# Patient Record
Sex: FEMALE | Race: WHITE | Employment: FULL TIME | URBAN - METROPOLITAN AREA
[De-identification: names, ages, dates, MRNs, and addresses within clinical notes are randomized per-mention and may not be internally consistent; named-entity substitution may affect disease eponyms.]

---

## 2018-12-10 DIAGNOSIS — E11.9 TYPE 2 DIABETES MELLITUS WITHOUT COMPLICATION, WITH LONG-TERM CURRENT USE OF INSULIN (HCC): ICD-10-CM

## 2018-12-10 DIAGNOSIS — I10 ESSENTIAL HYPERTENSION: ICD-10-CM

## 2018-12-10 DIAGNOSIS — Z79.4 TYPE 2 DIABETES MELLITUS WITHOUT COMPLICATION, WITH LONG-TERM CURRENT USE OF INSULIN (HCC): ICD-10-CM

## 2018-12-10 DIAGNOSIS — E11.8 TYPE 2 DIABETES MELLITUS WITH COMPLICATION, WITHOUT LONG-TERM CURRENT USE OF INSULIN (HCC): ICD-10-CM

## 2018-12-11 RX ORDER — LISINOPRIL AND HYDROCHLOROTHIAZIDE 12.5; 1 MG/1; MG/1
1 TABLET ORAL 2 TIMES DAILY
Qty: 180 TABLET | Refills: 0 | Status: SHIPPED
Start: 2018-12-11 | End: 2022-07-07 | Stop reason: DRUGHIGH

## 2020-12-11 ENCOUNTER — HOSPITAL ENCOUNTER (EMERGENCY)
Age: 55
Discharge: HOME | End: 2020-12-11

## 2020-12-11 VITALS
DIASTOLIC BLOOD PRESSURE: 93 MMHG | WEIGHT: 260 LBS | RESPIRATION RATE: 18 BRPM | HEIGHT: 62 IN | HEART RATE: 88 BPM | OXYGEN SATURATION: 97 % | TEMPERATURE: 98.5 F | BODY MASS INDEX: 47.84 KG/M2 | SYSTOLIC BLOOD PRESSURE: 141 MMHG

## 2020-12-11 PROCEDURE — 99283 EMERGENCY DEPT VISIT LOW MDM: CPT

## 2020-12-11 PROCEDURE — 6370000000 HC RX 637 (ALT 250 FOR IP)

## 2020-12-11 RX ADMIN — PHENYLEPHRINE HYDROCHLORIDE: 0.25 SPRAY NASAL at 19:11

## 2020-12-11 NOTE — LETTER
5 [facility] Emergency Department  [address] [place] [address]  Phone: [phone]               December 11, 2020    Patient: Erna Mclain   YOB: 1965   Date of Visit: 12/11/2020       To Whom It May Concern:    Erna Mclain was seen and treated in our emergency department on 12/11/2020. She may return to work on 12-13-20.       Sincerely,       LACHELLE Saldana CNP         Signature:__________________________________

## 2020-12-11 NOTE — LETTER
5 [facility] Emergency Department  [address] [place] [address]  Phone: [phone]               December 11, 2020    Patient: Jerad Fairchild   YOB: 1965   Date of Visit: 12/11/2020       To Whom It May Concern:    Jerad Fairchild was seen and treated in our emergency department on 12/11/2020. She may return to work on 12-13-20.       Sincerely,       LACHELLE Bowling CNP         Signature:__________________________________

## 2020-12-11 NOTE — LETTER
5 [facility] Emergency Department  [address] [place] [address]  Phone: [phone]               December 11, 2020    Patient: Olga Kee   YOB: 1965   Date of Visit: 12/11/2020       To Whom It May Concern:    Olga Kee was seen and treated in our emergency department on 12/11/2020. She may return to work on 12-13-20.       Sincerely,       LACHELLE Sylvester CNP         Signature:__________________________________

## 2020-12-11 NOTE — ED PROVIDER NOTES
[address] [place] [address]  Department of Emergency Medicine   ED  Encounter Note  Admit Date/RoomTime: 2020  6:49 PM  ED Room: 36/36    NAME: Jamee Canas  : 1965  MRN: [id number]     Chief Complaint:  Epistaxis (since .91.27.04, denies thinners)    History of Present Illness        Jamee Canas is a 60 y.o. old female who presents to the emergency department by private vehicle, for gradual onset of non-traumatic right and left sided nosebleed, which began 1 hour(s) prior to arrival.  Since onset the symptoms have been gradually improving. She takes no blood thinning agents. She has no associated symptoms. The symptoms are worsened by none and relieved by none. She denies headache, fever, chills, sore throat, cough, chest pain, shortness of breath, abdominal pain, nausea, vomiting, bruising, melena. ROS   Pertinent positives and negatives are stated within HPI, all other systems reviewed and are negative. Past Medical History:  has a past medical history of Diabetes ([address] Diabetes mellitus ([address] Hypertension, and Pancreatitis. Surgical History:  has a past surgical history that includes Cholecystectomy () and Dilation and curettage of uterus (). Social History:  reports that she has never smoked. She has never used smokeless tobacco. She reports current alcohol use. She reports that she does not use drugs. Family History: family history includes Cancer in her maternal grandfather, maternal grandmother, paternal grandfather, and paternal grandmother; Diabetes in her father, maternal grandfather, and maternal grandmother; Heart Disease in her father; High Blood Pressure in her father and maternal grandfather; Thyroid Disease in her mother and sister. Allergies: Patient has no known allergies.     Physical Exam   Oxygen Saturation Interpretation: Normal.        ED Triage Vitals [20 1847]   BP Temp Temp src Pulse Resp SpO2 Height Weight   (!) 141/93 98.5 °F (36.9 °C) -- 88 18 97 % 5' 2\" (1.575 m) 260 lb (117.9 kg)         Constitutional:  Alert, development consistent with age. Eyes:  PERRLA, EOM intact. Conjunctiva:  normal.  Nose:        External:                   Swelling: None. Deformity: No.            Tenderness:  none. Skin:  no erythema, rash or wounds noted. Intranasal:  without erythema or discharge. Abrasion: no.            Laceration: no.            Septal Hematoma:  absent. Septal Deviation:  absent. Bleeding:             Status/Amount: mild active bleeding. Site:  From right Naris(es). Source: From an unidentified source. Sinuses: no bilateral maxillary sinus tenderness. no bilateral frontal sinus tenderness. Throat: There is mild amount of blood noted in posterior pharynx. Neck:  Normal ROM. Supple. Respiratory:  Clear to auscultation and breath sounds equal.    CV: Regular rate and rhythm, normal heart sounds, without pathological murmurs, ectopy, gallops, or rubs. Integument:  No rashes, erythema present, unless noted elsewhere. Lymphatics: No lymphangitis or adenopathy noted. Neurological:  Oriented. Motor functions intact. Lab / Imaging Results   (All laboratory and radiology results have been personally reviewed by myself)  Labs:  No results found for this visit on 12/11/20. Imaging: All Radiology results interpreted by Radiologist unless otherwise noted. No orders to display       ED Course / Medical Decision Making     Medications   phenylephrine (JEAN-CLAUDE-SYNEPHRINE) 1 % nasal spray 1 drop (has no administration in time range)   phenylephrine (JEAN-CLAUDE-SYNEPHRINE) 0.25 % nasal spray (  Given 12/11/20 1911)        Re-examination:  12/11/20       Time: 1927  Patients condition has stabilized. Hemostasis achieved. Consult(s):   none.     Procedure(s):   none    MDM:   Patient presented with nontraumatic epistaxis. She appears otherwise well and nontoxic. Hemostasis was achieved with cotton balls and Mathew-Synephrine. Patient was monitored and there was no additional bleeding. She is appropriate for discharge and outpatient follow-up. She is given instructions on returning to the emergency department with any new or worsening symptoms. Plan of Care/Counseling:  I reviewed today's visit with the patient in addition to providing specific details for the plan of care and counseling regarding the diagnosis and prognosis. Questions are answered at this time and are agreeable with the plan. Assessment      1. Epistaxis      Plan   Discharge to home. Patient condition is good    New Medications     New Prescriptions    No medications on file     Electronically signed by LACHELLE Samuel CNP   DD: 12/11/20  **This report was transcribed using voice recognition software. Every effort was made to ensure accuracy; however, inadvertent computerized transcription errors may be present.   END OF ED PROVIDER NOTE       LACHELLE Ho CNP  12/11/20 [id number]

## 2020-12-11 NOTE — LETTER
5 [facility] Emergency Department  [address] [place] [address]  Phone: [phone]               December 11, 2020    Patient: Lawrence San   YOB: 1965   Date of Visit: 12/11/2020       To Whom It May Concern:    Lawrence San was seen and treated in our emergency department on 12/11/2020. She may return to work on 12-13-20.       Sincerely,       LACHELLE Alvarez CNP         Signature:__________________________________

## 2020-12-11 NOTE — LETTER
5 [facility] Emergency Department  [address] [place] [address]  Phone: [phone]               December 11, 2020    Patient: Kathi Wall   YOB: 1965   Date of Visit: 12/11/2020       To Whom It May Concern:    Kathi Wall was seen and treated in our emergency department on 12/11/2020. She may return to work on 12-13-20.       Sincerely,       LACHELLE Sandoval CNP         Signature:__________________________________

## 2022-03-03 ENCOUNTER — OFFICE VISIT (OUTPATIENT)
Dept: FAMILY MEDICINE CLINIC | Age: 57
End: 2022-03-03
Payer: COMMERCIAL

## 2022-03-03 VITALS
BODY MASS INDEX: 45.08 KG/M2 | SYSTOLIC BLOOD PRESSURE: 188 MMHG | HEART RATE: 78 BPM | RESPIRATION RATE: 18 BRPM | OXYGEN SATURATION: 97 % | DIASTOLIC BLOOD PRESSURE: 100 MMHG | TEMPERATURE: 97.1 F | WEIGHT: 245 LBS | HEIGHT: 62 IN

## 2022-03-03 DIAGNOSIS — Z79.4 TYPE 2 DIABETES MELLITUS WITHOUT COMPLICATION, WITH LONG-TERM CURRENT USE OF INSULIN (HCC): ICD-10-CM

## 2022-03-03 DIAGNOSIS — E11.9 TYPE 2 DIABETES MELLITUS WITHOUT COMPLICATION, WITH LONG-TERM CURRENT USE OF INSULIN (HCC): ICD-10-CM

## 2022-03-03 DIAGNOSIS — Z12.31 SCREENING MAMMOGRAM FOR BREAST CANCER: ICD-10-CM

## 2022-03-03 DIAGNOSIS — I10 PRIMARY HYPERTENSION: ICD-10-CM

## 2022-03-03 DIAGNOSIS — Z00.00 ANNUAL PHYSICAL EXAM: Primary | ICD-10-CM

## 2022-03-03 DIAGNOSIS — E11.8 TYPE 2 DIABETES MELLITUS WITH COMPLICATION, WITHOUT LONG-TERM CURRENT USE OF INSULIN (HCC): ICD-10-CM

## 2022-03-03 DIAGNOSIS — I10 ESSENTIAL HYPERTENSION: ICD-10-CM

## 2022-03-03 LAB — HBA1C MFR BLD: 8.7 %

## 2022-03-03 PROCEDURE — 83036 HEMOGLOBIN GLYCOSYLATED A1C: CPT | Performed by: FAMILY MEDICINE

## 2022-03-03 PROCEDURE — 99386 PREV VISIT NEW AGE 40-64: CPT | Performed by: FAMILY MEDICINE

## 2022-03-03 RX ORDER — LISINOPRIL AND HYDROCHLOROTHIAZIDE 25; 20 MG/1; MG/1
1 TABLET ORAL DAILY
Qty: 30 TABLET | Refills: 0 | Status: SHIPPED
Start: 2022-03-03 | End: 2022-04-01 | Stop reason: SDUPTHER

## 2022-03-03 RX ORDER — CLONIDINE HYDROCHLORIDE 0.1 MG/1
0.1 TABLET ORAL ONCE
Status: COMPLETED | OUTPATIENT
Start: 2022-03-03 | End: 2022-03-03

## 2022-03-03 RX ADMIN — CLONIDINE HYDROCHLORIDE 0.1 MG: 0.1 TABLET ORAL at 09:11

## 2022-03-03 SDOH — ECONOMIC STABILITY: FOOD INSECURITY: WITHIN THE PAST 12 MONTHS, YOU WORRIED THAT YOUR FOOD WOULD RUN OUT BEFORE YOU GOT MONEY TO BUY MORE.: NEVER TRUE

## 2022-03-03 SDOH — ECONOMIC STABILITY: FOOD INSECURITY: WITHIN THE PAST 12 MONTHS, THE FOOD YOU BOUGHT JUST DIDN'T LAST AND YOU DIDN'T HAVE MONEY TO GET MORE.: NEVER TRUE

## 2022-03-03 ASSESSMENT — LIFESTYLE VARIABLES: HOW OFTEN DO YOU HAVE A DRINK CONTAINING ALCOHOL: NEVER

## 2022-03-03 ASSESSMENT — PATIENT HEALTH QUESTIONNAIRE - PHQ9
SUM OF ALL RESPONSES TO PHQ QUESTIONS 1-9: 0
1. LITTLE INTEREST OR PLEASURE IN DOING THINGS: 0
SUM OF ALL RESPONSES TO PHQ9 QUESTIONS 1 & 2: 0
SUM OF ALL RESPONSES TO PHQ QUESTIONS 1-9: 0
SUM OF ALL RESPONSES TO PHQ QUESTIONS 1-9: 0
2. FEELING DOWN, DEPRESSED OR HOPELESS: 0
SUM OF ALL RESPONSES TO PHQ QUESTIONS 1-9: 0

## 2022-03-03 ASSESSMENT — SOCIAL DETERMINANTS OF HEALTH (SDOH): HOW HARD IS IT FOR YOU TO PAY FOR THE VERY BASICS LIKE FOOD, HOUSING, MEDICAL CARE, AND HEATING?: SOMEWHAT HARD

## 2022-03-03 NOTE — PROGRESS NOTES
3/7/2022    Chief Complaint   Patient presents with    Annual Exam     patient is a new patient has not been here since 2017    Hypertension     has not had medication in years        Screening Questions:    Exercise 30 minutes daily 5 times weekly:  No   Mammogram every 1-2 years age 36, then annually after age 48: No   Pap smear UTD:  No    Fecal occult blood yearly after age 50/Colonoscopy:  No   Eye exam every 2-4 years, yearly if diabetic:  No    Dental exam:  No    BMI elevated: Body mass index is 44.81 kg/m². Haritha Coates Counseled on weight loss   Tobacco use: No  . Cessation discussed        Labs:  No results found for: EAG  Lab Results   Component Value Date    LDLCALC 63 01/22/2016      CBC:   Lab Results   Component Value Date    WBC 7.4 01/22/2016    RBC 5.13 01/22/2016    HGB 14.2 01/22/2016    HCT 43.8 01/22/2016    MCV 85.4 01/22/2016    MCH 27.7 01/22/2016    MCHC 32.4 01/22/2016    RDW 14.1 01/22/2016     01/22/2016    MPV 8.1 01/22/2016         Patient's past medical, surgical, social and/or family history reviewed, updated in chart, and are non-contributory (unless otherwise stated). Medications and allergies also reviewed and updated in chart.     ROS  Review of Systems - General ROS: negative for - chills, fatigue, fever, night sweats, sleep disturbance, weight gain or weight loss  Psychological ROS: negative for - anxiety, behavioral disorder, depression, hallucinations, irritability, memory difficulties, mood swings, sleep disturbances or suicidal ideation  ENT ROS: negative for - epistaxis, headaches, hearing change, nasal congestion, nasal discharge, nasal polyps, sinus pain, tinnitus, vertigo or visual changes  Hematological and Lymphatic ROS: negative for - bleeding problems, blood clots, fatigue or swollen lymph nodes  Respiratory ROS: negative for - cough, orthopnea, shortness of breath, sputum changes, tachypnea or wheezing  Cardiovascular ROS: negative for - chest pain, dyspnea on exertion, irregular heartbeat, loss of consciousness, palpitations, paroxysmal nocturnal dyspnea or rapid heart rate  Gastrointestinal ROS: negative for - abdominal pain, blood in stools, change in bowel habits, constipation, diarrhea, gas/bloating, heartburn or nausea/vomiting  Musculoskeletal ROS: negative for - joint pain, joint stiffness, joint swelling or muscle, back pain, bowel or bladder incontinence  Neurological ROS: negative for - behavioral changes, confusion, dizziness, headaches, memory loss, numbness/tingling, seizures or speech problems, weakness  Dermatological ROS: negative for - dry skin, mole changes, nail changes, pruritus, rash or skin lesion changes    Physical Exam  BP (!) 188/100   Pulse 78   Temp 97.1 °F (36.2 °C)   Resp 18   Ht 5' 2\" (1.575 m)   Wt 245 lb (111.1 kg)   SpO2 97%   BMI 44.81 kg/m²   Wt Readings from Last 3 Encounters:   03/03/22 245 lb (111.1 kg)   12/11/20 260 lb (117.9 kg)   03/28/17 241 lb (109.3 kg)     Temp Readings from Last 3 Encounters:   03/03/22 97.1 °F (36.2 °C)   12/11/20 98.5 °F (36.9 °C)   03/28/17 97 °F (36.1 °C)     BP Readings from Last 3 Encounters:   03/03/22 (!) 188/100   12/11/20 (!) 141/93   03/28/17 120/84     Pulse Readings from Last 3 Encounters:   03/03/22 78   12/11/20 88   03/28/17 92       General appearance: alert, well appearing, and in no distress, oriented to person, place, and time and normal appearing weight. CVS exam: normal rate, regular rhythm, normal S1, S2, no murmurs, rubs, clicks or gallops. Radial pulses 2+ bilateral.  PT/DP pulse 2+ bilat. No C/C/E    Chest: clear to auscultation, no wheezes, rales or rhonchi, symmetric air entry. Abdomen: Soft, non-tender, non-distended, positive BS in all 4 quadrants    Extremities:Dorsalis pedis pulses palpated bilaterally, no clubbing, cyanosis, edema or erythema, Sensory exam of the foot is normal, tested with the monofilament.  Good pulses, no lesions or ulcers, good peripheral pulses. SKIN: no lesions, jaundice, petechiae, pallor, cyanosis, ecchymosis    NEURO: gross motor exam normal by observation, gait normal    Mental status - alert, oriented to person, place, and time, normal mood, behavior, speech, dress, motor activity, and thought processes      Assessment/Plan  Dyllan Rosales was seen today for annual exam and hypertension. Diagnoses and all orders for this visit:    Annual physical exam  -     CBC; Future  -     Comprehensive Metabolic Panel; Future  -     Lipid Panel; Future    Primary hypertension  -     cloNIDine (CATAPRES) tablet 0.1 mg    Essential hypertension    Type 2 diabetes mellitus without complication, with long-term current use of insulin (HCC)  -     metFORMIN (GLUCOPHAGE) 500 MG tablet; Take 1 tablet by mouth 2 times daily (with meals)    Type 2 diabetes mellitus with complication, without long-term current use of insulin (HCC)  -     metFORMIN (GLUCOPHAGE) 500 MG tablet; Take 1 tablet by mouth 2 times daily (with meals)  -     POCT glycosylated hemoglobin (Hb A1C)    Screening mammogram for breast cancer  -     [facility] DIGITAL SCREEN W OR WO CAD BILATERAL; Future    Other orders  -     lisinopril-hydroCHLOROthiazide (PRINZIDE;ZESTORETIC) 20-25 MG per tablet; Take 1 tablet by mouth daily        Discussed preventive care and screenings, lab results and the importance of diet and exercise. Advised to return to the office for annual exam or sooner if needed. Return in about 1 week (around 3/10/2022), or LABS NEXT VISIT, for [address] [place] [address]. Call or go to ED immediately if symptoms worsen or persist.    Educational materials and/or home exercises printed for patient's review and were included in patient instructions on his/her After Visit Summary and given to patient at the end of visit. Counseled regarding above diagnosis, including possible risks and complications,  especially if left uncontrolled.     Counseled regarding the possible side effects, risks, benefits and alternatives to treatment; patient and/or guardian verbalizes understanding, agrees, feels comfortable with and wishes to proceed with above treatment plan. Advised patient to call with any new medication issues, and read all Rx info from pharmacy to assure aware of all possible risks and side effects of medication before taking. Reviewed age and gender appropriate health screening exams and vaccinations. Advised patient regarding importance of keeping up with recommended health maintenance and to schedule as soon as possible if overdue, as this is important in assessing for undiagnosed pathology, especially cancer, as well as protecting against potentially harmful/life threatening disease. Patient and/or guardian verbalizes understanding and agrees with above counseling, assessment and plan. All questions answered.

## 2022-03-07 DIAGNOSIS — Z00.00 ANNUAL PHYSICAL EXAM: ICD-10-CM

## 2022-03-07 LAB
ALBUMIN SERPL-MCNC: 4.2 G/DL (ref 3.5–5.2)
ALP BLD-CCNC: 94 U/L (ref 35–104)
ALT SERPL-CCNC: 24 U/L (ref 0–32)
ANION GAP SERPL CALCULATED.3IONS-SCNC: 9 MMOL/L (ref 7–16)
AST SERPL-CCNC: 19 U/L (ref 0–31)
BILIRUB SERPL-MCNC: 0.4 MG/DL (ref 0–1.2)
BUN BLDV-MCNC: 26 MG/DL (ref 6–20)
CALCIUM SERPL-MCNC: 9.8 MG/DL (ref 8.6–10.2)
CHLORIDE BLD-SCNC: 97 MMOL/L (ref 98–107)
CHOLESTEROL, TOTAL: 167 MG/DL (ref 0–199)
CO2: 29 MMOL/L (ref 22–29)
CREAT SERPL-MCNC: 0.8 MG/DL (ref 0.5–1)
GFR AFRICAN AMERICAN: >60
GFR NON-AFRICAN AMERICAN: >60 ML/MIN/1.73
GLUCOSE BLD-MCNC: 195 MG/DL (ref 74–99)
HCT VFR BLD CALC: 47.2 % (ref 34–48)
HDLC SERPL-MCNC: 78 MG/DL
HEMOGLOBIN: 14.7 G/DL (ref 11.5–15.5)
LDL CHOLESTEROL CALCULATED: 74 MG/DL (ref 0–99)
MCH RBC QN AUTO: 27.5 PG (ref 26–35)
MCHC RBC AUTO-ENTMCNC: 31.1 % (ref 32–34.5)
MCV RBC AUTO: 88.4 FL (ref 80–99.9)
PDW BLD-RTO: 14.2 FL (ref 11.5–15)
PLATELET # BLD: 304 E9/L (ref 130–450)
PMV BLD AUTO: 10.5 FL (ref 7–12)
POTASSIUM SERPL-SCNC: 4.6 MMOL/L (ref 3.5–5)
RBC # BLD: 5.34 E12/L (ref 3.5–5.5)
SODIUM BLD-SCNC: 135 MMOL/L (ref 132–146)
TOTAL PROTEIN: 7.4 G/DL (ref 6.4–8.3)
TRIGL SERPL-MCNC: 74 MG/DL (ref 0–149)
VLDLC SERPL CALC-MCNC: 15 MG/DL
WBC # BLD: 10.5 E9/L (ref 4.5–11.5)

## 2022-03-08 ENCOUNTER — OFFICE VISIT (OUTPATIENT)
Dept: FAMILY MEDICINE CLINIC | Age: 57
End: 2022-03-08
Payer: COMMERCIAL

## 2022-03-08 ENCOUNTER — TELEPHONE (OUTPATIENT)
Dept: FAMILY MEDICINE CLINIC | Age: 57
End: 2022-03-08

## 2022-03-08 VITALS
RESPIRATION RATE: 16 BRPM | DIASTOLIC BLOOD PRESSURE: 87 MMHG | BODY MASS INDEX: 44.07 KG/M2 | HEIGHT: 62 IN | OXYGEN SATURATION: 96 % | TEMPERATURE: 97.2 F | SYSTOLIC BLOOD PRESSURE: 136 MMHG | HEART RATE: 88 BPM | WEIGHT: 239.5 LBS

## 2022-03-08 DIAGNOSIS — E11.9 TYPE 2 DIABETES MELLITUS WITHOUT COMPLICATION, WITH LONG-TERM CURRENT USE OF INSULIN (HCC): ICD-10-CM

## 2022-03-08 DIAGNOSIS — Z79.4 TYPE 2 DIABETES MELLITUS WITHOUT COMPLICATION, WITH LONG-TERM CURRENT USE OF INSULIN (HCC): ICD-10-CM

## 2022-03-08 DIAGNOSIS — I10 PRIMARY HYPERTENSION: Primary | ICD-10-CM

## 2022-03-08 PROCEDURE — 82044 UR ALBUMIN SEMIQUANTITATIVE: CPT | Performed by: FAMILY MEDICINE

## 2022-03-08 PROCEDURE — 99213 OFFICE O/P EST LOW 20 MIN: CPT | Performed by: FAMILY MEDICINE

## 2022-03-08 PROCEDURE — 3052F HG A1C>EQUAL 8.0%<EQUAL 9.0%: CPT | Performed by: FAMILY MEDICINE

## 2022-03-08 RX ORDER — LANCETS
1 EACH MISCELLANEOUS DAILY
Qty: 100 EACH | Refills: 5 | Status: SHIPPED | OUTPATIENT
Start: 2022-03-08

## 2022-03-08 RX ORDER — GLUCOSAMINE HCL/CHONDROITIN SU 500-400 MG
CAPSULE ORAL DAILY
Qty: 100 STRIP | Refills: 5 | Status: SHIPPED | OUTPATIENT
Start: 2022-03-08

## 2022-03-08 NOTE — PROGRESS NOTES
3/8/2022    Chief Complaint   Patient presents with    Follow-up     Bp and Diabetes    Discuss Labs     No concerns or complaints       Ava Lennon is a 60 y.o. patient that presents today for:    Hypertension: Here for follow up chronic hypertension. Patient is  compliant with lifestyle modifications like exercise and adherence to a low salt diet. Patient is  well controlled. Denies chest pain, diaphoresis, dyspnea, dyspnea on exertion, peripheral edema, palpitations, HA, visual issues. Med list reviewed. Taking as prescribed. No adverse effects. Feeling well otherwise, no complaints. Patient's past medical, surgical, social and/or family history reviewed, updated in chart, and are non-contributory (unless otherwise stated). Medications and allergies also reviewed and updated in chart. ROS negative unless otherwise specified    Physical Exam  Wt Readings from Last 3 Encounters:   03/08/22 239 lb 8 oz (108.6 kg)   03/03/22 245 lb (111.1 kg)   12/11/20 260 lb (117.9 kg)     Temp Readings from Last 3 Encounters:   03/08/22 97.2 °F (36.2 °C) (Temporal)   03/03/22 97.1 °F (36.2 °C)   12/11/20 98.5 °F (36.9 °C)     BP Readings from Last 3 Encounters:   03/08/22 136/87   03/03/22 (!) 188/100   12/11/20 (!) 141/93     Pulse Readings from Last 3 Encounters:   03/08/22 88   03/03/22 78   12/11/20 88       General appearance: alert, well appearing, and in no distress, oriented to person, place, and time and normal appearing weight. CVS exam: normal rate, regular rhythm, normal S1, S2, no murmurs, rubs, clicks or gallops. Radial pulses 2+ bilateral.  PT/DP pulse 2+ bilat. No C/C/E    Chest: clear to auscultation, no wheezes, rales or rhonchi, symmetric air entry.      Abdomen: Soft, non-tender, non-distended, positive BS in all 4 quadrants    Extremities:Dorsalis pedis pulses palpated bilaterally, no clubbing, cyanosis, edema or erythema     SKIN: warm, dry, no lesions, jaundice, petechiae, pallor, cyanosis, ecchymosis    NEURO: gross motor exam normal by observation, gait normal    Mental status - alert, oriented to person, place, and time, normal mood, behavior, speech, dress, motor activity, and thought processes      Assessment/Plan  Chito Ruiz was seen today for follow-up and discuss labs. Diagnoses and all orders for this visit:    Primary hypertension  -     TSH; Future    Type 2 diabetes mellitus without complication, with long-term current use of insulin (HCC)  -     POCT Microalbumin  -     blood glucose monitor strips; by Other route daily  -     Blood Gluc Meter Disp-Strips (BLOOD GLUCOSE METER DISPOSABLE) FARHAD; 1 Units by Does not apply route daily  -     blood glucose monitor supplies; 1 each by Other route daily  -     Lancets Thin MISC; 1 Units by Does not apply route daily        Return in about 3 months (around 6/8/2022), or if symptoms worsen or fail to improve. Call or go to ED immediately if symptoms worsen or persist.    Educational materials and/or home exercises printed for patient's review and were included in patient instructions on his/her After Visit Summary and given to patient at the end of visit. Counseled regarding above diagnosis, including possible risks and complications,  especially if left uncontrolled. Counseled regarding the possible side effects, risks, benefits and alternatives to treatment; patient and/or guardian verbalizes understanding, agrees, feels comfortable with and wishes to proceed with above treatment plan. Advised patient to call with any new medication issues, and read all Rx info from pharmacy to assure aware of all possible risks and side effects of medication before taking. Reviewed age and gender appropriate health screening exams and vaccinations.   Advised patient regarding importance of keeping up with recommended health maintenance and to schedule as soon as possible if overdue, as this is important in assessing for undiagnosed pathology, especially cancer, as well as protecting against potentially harmful/life threatening disease. Patient and/or guardian verbalizes understanding and agrees with above counseling, assessment and plan. All questions answered.       Rahel Wasserman, DO

## 2022-04-01 RX ORDER — LISINOPRIL AND HYDROCHLOROTHIAZIDE 25; 20 MG/1; MG/1
1 TABLET ORAL DAILY
Qty: 30 TABLET | Refills: 0 | Status: SHIPPED
Start: 2022-04-01 | End: 2022-05-05 | Stop reason: SDUPTHER

## 2022-05-05 DIAGNOSIS — E11.8 TYPE 2 DIABETES MELLITUS WITH COMPLICATION, WITHOUT LONG-TERM CURRENT USE OF INSULIN (HCC): ICD-10-CM

## 2022-05-05 DIAGNOSIS — Z79.4 TYPE 2 DIABETES MELLITUS WITHOUT COMPLICATION, WITH LONG-TERM CURRENT USE OF INSULIN (HCC): ICD-10-CM

## 2022-05-05 DIAGNOSIS — E11.9 TYPE 2 DIABETES MELLITUS WITHOUT COMPLICATION, WITH LONG-TERM CURRENT USE OF INSULIN (HCC): ICD-10-CM

## 2022-05-05 RX ORDER — LISINOPRIL AND HYDROCHLOROTHIAZIDE 25; 20 MG/1; MG/1
1 TABLET ORAL DAILY
Qty: 30 TABLET | Refills: 0 | Status: SHIPPED
Start: 2022-05-05 | End: 2022-06-16 | Stop reason: SDUPTHER

## 2022-06-16 RX ORDER — LISINOPRIL AND HYDROCHLOROTHIAZIDE 25; 20 MG/1; MG/1
1 TABLET ORAL DAILY
Qty: 30 TABLET | Refills: 0 | Status: SHIPPED
Start: 2022-06-16 | End: 2022-07-07 | Stop reason: SDUPTHER

## 2022-07-07 ENCOUNTER — OFFICE VISIT (OUTPATIENT)
Dept: FAMILY MEDICINE CLINIC | Age: 57
End: 2022-07-07
Payer: COMMERCIAL

## 2022-07-07 VITALS
BODY MASS INDEX: 44.16 KG/M2 | DIASTOLIC BLOOD PRESSURE: 74 MMHG | OXYGEN SATURATION: 94 % | HEART RATE: 83 BPM | RESPIRATION RATE: 18 BRPM | HEIGHT: 62 IN | SYSTOLIC BLOOD PRESSURE: 104 MMHG | WEIGHT: 240 LBS | TEMPERATURE: 97.3 F

## 2022-07-07 DIAGNOSIS — E11.9 TYPE 2 DIABETES MELLITUS WITHOUT COMPLICATION, WITH LONG-TERM CURRENT USE OF INSULIN (HCC): Primary | ICD-10-CM

## 2022-07-07 DIAGNOSIS — E11.8 TYPE 2 DIABETES MELLITUS WITH COMPLICATION, WITHOUT LONG-TERM CURRENT USE OF INSULIN (HCC): ICD-10-CM

## 2022-07-07 DIAGNOSIS — Z12.31 SCREENING MAMMOGRAM FOR BREAST CANCER: ICD-10-CM

## 2022-07-07 DIAGNOSIS — Z79.4 TYPE 2 DIABETES MELLITUS WITHOUT COMPLICATION, WITH LONG-TERM CURRENT USE OF INSULIN (HCC): Primary | ICD-10-CM

## 2022-07-07 LAB — HBA1C MFR BLD: 8.7 %

## 2022-07-07 PROCEDURE — 83036 HEMOGLOBIN GLYCOSYLATED A1C: CPT | Performed by: FAMILY MEDICINE

## 2022-07-07 PROCEDURE — 3052F HG A1C>EQUAL 8.0%<EQUAL 9.0%: CPT | Performed by: FAMILY MEDICINE

## 2022-07-07 PROCEDURE — 99214 OFFICE O/P EST MOD 30 MIN: CPT | Performed by: FAMILY MEDICINE

## 2022-07-07 RX ORDER — LISINOPRIL AND HYDROCHLOROTHIAZIDE 25; 20 MG/1; MG/1
1 TABLET ORAL DAILY
Qty: 90 TABLET | Refills: 3 | Status: SHIPPED
Start: 2022-07-07 | End: 2022-10-06 | Stop reason: SDUPTHER

## 2022-07-07 NOTE — PROGRESS NOTES
7/7/2022    Chief Complaint   Patient presents with    Sinusitis     no weather related allergies dealt with for years no fever no headache no body aches or chills no sore throat        Diabetes Mellitus Type II, Follow-up: Patient here for follow-up of Type 2 diabetes mellitus. This is a longstanding problem. Is taking all medications as prescribed and is tolerating well. Has been monitoring blood glucose at home. Lab Results   Component Value Date    LABA1C 8.7 07/07/2022    LABA1C 8.7 03/03/2022    LABA1C 6.9 03/28/2017     Lab Results   Component Value Date    LDLCALC 74 03/07/2022    CREATININE 0.8 03/07/2022        Microalbumin: Microalbumen/Creatinine ratio:  No components found for: RUCREAT     Hypertension: Patient is here for follow up chronic hypertension. Patient is  compliant with lifestyle modifications like exercising and adherence to a low salt diet. Patient denies chest pain, diaphoresis, dyspnea, dyspnea on exertion, peripheral edema, palpitations, HA, visual issues. See List for current meds. Taking as prescribed. No adverse effects. Hyperlipidemia:  Patient presents with hyperlipidemia. Is asymptomatic. This is a chronic problem. Patient is  well controlled, as reviewed and seen on most recent labs. Compliance with treatment thus far has been adequate. No adverse effects. Patient's past medical, surgical, social and/or family history reviewed, updated in chart, and are non-contributory (unless otherwise stated). Medications and allergies also reviewed and updated in chart.     ROS negative unless otherwise specified      Physical Exam  Wt Readings from Last 3 Encounters:   07/07/22 240 lb (108.9 kg)   03/08/22 239 lb 8 oz (108.6 kg)   03/03/22 245 lb (111.1 kg)     Temp Readings from Last 3 Encounters:   07/07/22 97.3 °F (36.3 °C)   03/08/22 97.2 °F (36.2 °C) (Temporal)   03/03/22 97.1 °F (36.2 °C)     BP Readings from Last 3 Encounters:   07/07/22 104/74   03/08/22 136/87 03/03/22 (!) 188/100     Pulse Readings from Last 3 Encounters:   07/07/22 83   03/08/22 88   03/03/22 78       General appearance: alert, well appearing, and in no distress, oriented to person, place, and time and normal appearing weight. CVS exam: normal rate, regular rhythm, normal S1, S2, no murmurs, rubs, clicks or gallops. Radial pulses 2+ bilateral.  PT/DP pulse 2+ bilat. No C/C/E    Chest: clear to auscultation, no wheezes, rales or rhonchi, symmetric air entry. Abdomen: Soft, non-tender, non-distended, positive BS in all 4 quadrants    Extremities:Dorsalis pedis pulses palpated bilaterally, no clubbing, cyanosis, edema or erythema, Sensory exam of the foot is normal, tested with the monofilament. Good pulses, no lesions or ulcers, good peripheral pulses. SKIN: warm, dry, no lesions, jaundice, petechiae, pallor, cyanosis, ecchymosis    NEURO: gross motor exam normal by observation, gait normal    Mental status - alert, oriented to person, place, and time, normal mood, behavior, speech, dress, motor activity, and thought processes      Assessment/Plan  Marsha Hutton was seen today for sinusitis. Diagnoses and all orders for this visit:    Type 2 diabetes mellitus without complication, with long-term current use of insulin (HCC)  -     POCT glycosylated hemoglobin (Hb A1C)    Screening mammogram for breast cancer  -     [facility] DIGITAL SCREEN W OR WO CAD BILATERAL; Future    Type 2 diabetes mellitus with complication, without long-term current use of insulin (HCC)    Other orders  -     lisinopril-hydroCHLOROthiazide (PRINZIDE;ZESTORETIC) 20-25 MG per tablet; Take 1 tablet by mouth daily  -     metFORMIN (GLUCOPHAGE) 1000 MG tablet; Take 1 tablet by mouth 2 times daily (with meals)        Return in about 3 months (around 10/7/2022), or PAP SMEAR.       Call or go to ED immediately if symptoms worsen or persist.    Educational materials and/or home exercises printed for patient's review and were included in patient instructions on his/her After Visit Summary and given to patient at the end of visit. Counseled regarding above diagnosis, including possible risks and complications,  especially if left uncontrolled. Counseled regarding the possible side effects, risks, benefits and alternatives to treatment; patient and/or guardian verbalizes understanding, agrees, feels comfortable with and wishes to proceed with above treatment plan. Advised patient to call with any new medication issues, and read all Rx info from pharmacy to assure aware of all possible risks and side effects of medication before taking. Reviewed age and gender appropriate health screening exams and vaccinations. Advised patient regarding importance of keeping up with recommended health maintenance and to schedule as soon as possible if overdue, as this is important in assessing for undiagnosed pathology, especially cancer, as well as protecting against potentially harmful/life threatening disease. Patient and/or guardian verbalizes understanding and agrees with above counseling, assessment and plan. All questions answered.     Rahel Wasserman, DO

## 2022-08-02 DIAGNOSIS — Z12.31 SCREENING MAMMOGRAM FOR BREAST CANCER: ICD-10-CM

## 2022-10-06 ENCOUNTER — OFFICE VISIT (OUTPATIENT)
Dept: FAMILY MEDICINE CLINIC | Age: 57
End: 2022-10-06
Payer: COMMERCIAL

## 2022-10-06 VITALS
HEIGHT: 62 IN | HEART RATE: 64 BPM | BODY MASS INDEX: 43.96 KG/M2 | OXYGEN SATURATION: 98 % | WEIGHT: 238.9 LBS | DIASTOLIC BLOOD PRESSURE: 85 MMHG | SYSTOLIC BLOOD PRESSURE: 120 MMHG | TEMPERATURE: 97.6 F

## 2022-10-06 DIAGNOSIS — L60.0 INGROWING LEFT GREAT TOENAIL: ICD-10-CM

## 2022-10-06 DIAGNOSIS — E11.8 TYPE 2 DIABETES MELLITUS WITH COMPLICATION, WITHOUT LONG-TERM CURRENT USE OF INSULIN (HCC): Primary | ICD-10-CM

## 2022-10-06 DIAGNOSIS — I10 PRIMARY HYPERTENSION: ICD-10-CM

## 2022-10-06 LAB
CREATININE URINE POCT: NORMAL
MICROALBUMIN/CREAT 24H UR: NORMAL MG/G{CREAT}
MICROALBUMIN/CREAT UR-RTO: NORMAL

## 2022-10-06 PROCEDURE — 3052F HG A1C>EQUAL 8.0%<EQUAL 9.0%: CPT | Performed by: FAMILY MEDICINE

## 2022-10-06 PROCEDURE — 99214 OFFICE O/P EST MOD 30 MIN: CPT | Performed by: FAMILY MEDICINE

## 2022-10-06 PROCEDURE — 82044 UR ALBUMIN SEMIQUANTITATIVE: CPT | Performed by: FAMILY MEDICINE

## 2022-10-06 RX ORDER — LISINOPRIL AND HYDROCHLOROTHIAZIDE 25; 20 MG/1; MG/1
1 TABLET ORAL DAILY
Qty: 90 TABLET | Refills: 3 | Status: SHIPPED | OUTPATIENT
Start: 2022-10-06

## 2022-10-06 ASSESSMENT — PATIENT HEALTH QUESTIONNAIRE - PHQ9
1. LITTLE INTEREST OR PLEASURE IN DOING THINGS: 0
SUM OF ALL RESPONSES TO PHQ QUESTIONS 1-9: 0
SUM OF ALL RESPONSES TO PHQ9 QUESTIONS 1 & 2: 0
SUM OF ALL RESPONSES TO PHQ QUESTIONS 1-9: 0
SUM OF ALL RESPONSES TO PHQ QUESTIONS 1-9: 0
2. FEELING DOWN, DEPRESSED OR HOPELESS: 0
SUM OF ALL RESPONSES TO PHQ QUESTIONS 1-9: 0

## 2022-10-06 NOTE — PROGRESS NOTES
10/6/2022    Chief Complaint   Patient presents with    Follow-up     3 month       Diabetes Mellitus Type II, Follow-up: Patient here for follow-up of Type 2 diabetes mellitus. This is a longstanding problem. Is taking all medications as prescribed and is tolerating well. Has been monitoring blood glucose at home. Lab Results   Component Value Date    LABA1C 8.7 07/07/2022    LABA1C 8.7 03/03/2022    LABA1C 6.9 03/28/2017     Lab Results   Component Value Date    LDLCALC 74 03/07/2022    CREATININE 0.8 03/07/2022        Microalbumin: Microalbumen/Creatinine ratio:  No components found for: RUCREAT     Hypertension: Patient is here for follow up chronic hypertension. Patient is  compliant with lifestyle modifications like exercising and adherence to a low salt diet. Patient denies chest pain, diaphoresis, dyspnea, dyspnea on exertion, peripheral edema, palpitations, HA, visual issues. See List for current meds. Taking as prescribed. No adverse effects. Patient's past medical, surgical, social and/or family history reviewed, updated in chart, and are non-contributory (unless otherwise stated). Medications and allergies also reviewed and updated in chart. ROS negative unless otherwise specified      Physical Exam  Wt Readings from Last 3 Encounters:   10/06/22 238 lb 14.4 oz (108.4 kg)   07/07/22 240 lb (108.9 kg)   03/08/22 239 lb 8 oz (108.6 kg)     Temp Readings from Last 3 Encounters:   10/06/22 97.6 °F (36.4 °C) (Temporal)   07/07/22 97.3 °F (36.3 °C)   03/08/22 97.2 °F (36.2 °C) (Temporal)     BP Readings from Last 3 Encounters:   10/06/22 120/85   07/07/22 104/74   03/08/22 136/87     Pulse Readings from Last 3 Encounters:   10/06/22 64   07/07/22 83   03/08/22 88       General appearance: alert, well appearing, and in no distress, oriented to person, place, and time and normal appearing weight. CVS exam: normal rate, regular rhythm, normal S1, S2, no murmurs, rubs, clicks or gallops. Radial pulses 2+ bilateral.  PT/DP pulse 2+ bilat. No C/C/E    Chest: clear to auscultation, no wheezes, rales or rhonchi, symmetric air entry. Abdomen: Soft, non-tender, non-distended, positive BS in all 4 quadrants    Extremities:Dorsalis pedis pulses palpated bilaterally, no clubbing, cyanosis, edema or erythema, Sensory exam of the foot is normal, tested with the monofilament. Good pulses, no lesions or ulcers, good peripheral pulses. SKIN: warm, dry, no lesions, jaundice, petechiae, pallor, cyanosis, ecchymosis    NEURO: gross motor exam normal by observation, gait normal    Mental status - alert, oriented to person, place, and time, normal mood, behavior, speech, dress, motor activity, and thought processes      Assessment/Plan  Al Guerra was seen today for follow-up. Diagnoses and all orders for this visit:    Type 2 diabetes mellitus with complication, without long-term current use of insulin (HCC)  -     metFORMIN (GLUCOPHAGE) 1000 MG tablet; Take 1 tablet by mouth 2 times daily (with meals)  -     Adali Whaley DPM, Podiatry, [place]  -     Hemoglobin A1C; Future  -     Lipid Panel; Future    Primary hypertension  -     lisinopril-hydroCHLOROthiazide (PRINZIDE;ZESTORETIC) 20-25 MG per tablet; Take 1 tablet by mouth daily  -     CBC; Future  -     Comprehensive Metabolic Panel; Future    BMI 40.0-44.9, adult ([address]    Ingrowing left great toenail  -     Adali Whaley DPM, Podiatry, [place]      Return in about 6 months (around 4/6/2023), or schedule for pap. Call or go to ED immediately if symptoms worsen or persist.    Educational materials and/or home exercises printed for patient's review and were included in patient instructions on his/her After Visit Summary and given to patient at the end of visit. Counseled regarding above diagnosis, including possible risks and complications,  especially if left uncontrolled.     Counseled regarding the possible side effects, risks, benefits and alternatives to treatment; patient and/or guardian verbalizes understanding, agrees, feels comfortable with and wishes to proceed with above treatment plan. Advised patient to call with any new medication issues, and read all Rx info from pharmacy to assure aware of all possible risks and side effects of medication before taking. Reviewed age and gender appropriate health screening exams and vaccinations. Advised patient regarding importance of keeping up with recommended health maintenance and to schedule as soon as possible if overdue, as this is important in assessing for undiagnosed pathology, especially cancer, as well as protecting against potentially harmful/life threatening disease. Patient and/or guardian verbalizes understanding and agrees with above counseling, assessment and plan. All questions answered.     Rahel Wasserman, DO

## 2023-01-04 ENCOUNTER — OFFICE VISIT (OUTPATIENT)
Dept: PODIATRY | Age: 58
End: 2023-01-04
Payer: COMMERCIAL

## 2023-01-04 VITALS
TEMPERATURE: 97.2 F | WEIGHT: 238 LBS | SYSTOLIC BLOOD PRESSURE: 130 MMHG | DIASTOLIC BLOOD PRESSURE: 70 MMHG | BODY MASS INDEX: 43.53 KG/M2

## 2023-01-04 DIAGNOSIS — B35.1 TINEA UNGUIUM: ICD-10-CM

## 2023-01-04 DIAGNOSIS — E11.9 ENCOUNTER FOR DIABETIC FOOT EXAM (HCC): Primary | ICD-10-CM

## 2023-01-04 DIAGNOSIS — M79.675 PAIN IN LEFT TOE(S): ICD-10-CM

## 2023-01-04 DIAGNOSIS — M79.674 PAIN IN TOE OF RIGHT FOOT: ICD-10-CM

## 2023-01-04 PROCEDURE — 99203 OFFICE O/P NEW LOW 30 MIN: CPT | Performed by: PODIATRIST

## 2023-01-04 NOTE — PROGRESS NOTES
[address]  [address] [place], [place] [id number]  Dept: [phone]  Dept Fax: [phone]  Loc: [phone]    DIABETIC NAIL PROGRESS NOTE  Date of patient's visit: 1/4/2023  Patient's Name:  Jason Flores YOB: 1965            Patient Care Team:  Faith Stearns DO as PCP - General (Family Medicine)  Faith Stearns DO as PCP - [facility]          Chief Complaint   Patient presents with    New Patient    Referral - General    Toe Pain     Left great toenail is bruised does not remember doing anything to it  Referred by Faith Stearns last visit 10/6/2022    Diabetes     Dm foot examination        Subjective:   Jason Flores comes to clinic for New Patient, Referral - General, Toe Pain (Left great toenail is bruised does not remember doing anything to it/Referred by Faith Stearns last visit 10/6/2022), and Diabetes (Dm foot examination )    she is a diabetic and states that diabetic foot exam.  This new patient is seen for fungus toenails and a diabetic foot exam.  Pt currently has complaint of thickened, elongated nails that they cannot manage by themselves. Pt's primary care physician is Jayesh Villagomez DO last seen on 10/6/2022   . Lab Results   Component Value Date    LABA1C 8.7 07/07/2022      Complains of numbness in the feet bilat.   Past Medical History:   Diagnosis Date    Diabetes ([facility] [address]     Diabetes mellitus ([facility] [address]     Hypertension     Pancreatitis 1991       No Known Allergies  Current Outpatient Medications on File Prior to Visit   Medication Sig Dispense Refill    metFORMIN (GLUCOPHAGE) 1000 MG tablet Take 1 tablet by mouth 2 times daily (with meals) 180 tablet 3    lisinopril-hydroCHLOROthiazide (PRINZIDE;ZESTORETIC) 20-25 MG per tablet Take 1 tablet by mouth daily 90 tablet 3    blood glucose monitor strips by Other route daily 100 strip 5    Blood Gluc Meter Disp-Strips (BLOOD GLUCOSE METER DISPOSABLE) FARHAD 1 Units by Does not apply route daily 1 each 0    blood glucose monitor supplies 1 each by Other route daily 1 each 0    Lancets Thin MISC 1 Units by Does not apply route daily 100 each 5    glucose blood VI test strips (EXACTECH TEST) strip 1 each by In Vitro route daily As needed. 100 each 3    GlucoCom Lancets MISC 1 each by Does not apply route daily 100 each 3     No current facility-administered medications on file prior to visit. Review of Systems    Review of Systems:   History obtained from chart review and the patient  General ROS: negative for - chills, fatigue, fever, night sweats or weight gain  Constitutional: Negative for chills, diaphoresis, fatigue, fever and unexpected weight change. Musculoskeletal: Positive for arthralgias, gait problem and joint swelling. Neurological ROS: negative for - behavioral changes, confusion, headaches or seizures. Negative for weakness and numbness. Dermatological ROS: negative for - mole changes, rash  Cardiovascular: Negative for leg swelling. Gastrointestinal: Negative for constipation, diarrhea, nausea and vomiting. Objective:  General: AAO x 3 in NAD.     Derm  Toenail Description nails are thick and mycotic yellow incurvated causing pain with shoe gear  Sites of Onychomycosis Involvement (Check affected area)  [x] [x] [x] [x] [x] [x] [x] [x] [x] [x]  5 4 3 2 1 1 2 3 4 5                          Right                                        Left    Thickness  [x] [x] [x] [x] [x] [x] [x] [x] [x] [x]  5 4 3 2 1 1 2 3 4 5                         Right                                        Left    Dystrophic Changes   [x] [x] [x] [x] [x] [x] [x] [x] [x] [x]  5 4 3 2 1 1 2 3 4 5                         Right                                        Left    Color   [x] [x] [x] [x] [x] [x] [x] [x] [x] [x]  5 4 3 2 1 1 2 3 4 5                          Right Left    Incurvation/Ingrowin   [x] [x] [x] [x] [x] [x] [x] [x] [x] [x]  5 4 3 2 1 1 2 3 4 5                         Right                                        Left    Inflammation/Pain   [x] [x] [x] [x] [x] [x] [x] [x] [x] [x]  5 4 3 2 1 1 2 3 4 5                         Right                                        Left      Dermatologic Exam:  Skin lesion/ulceration no ulcers or lesions noted. Skin no ulcer noted  Loss of hair  lower extremity      Musculoskeletal:     1st MPJ ROM decreased, Bilateral.  Muscle Bilateral.  Pain present upon palpation of toenails first digit left foot second digit right foot, Bilateral. decreased medial longitudinal arch, Bilateral.  Ankle ROM decreased,Bilateral.    Dorsally contracted digits negative    Vascular: DP and PT pulses palpable CFT <2 seconds seconds, Bilateral.  Hair growth absent to the level of the digits, Bilateral.  Edema  Bilateral.  Varicosities  Bilateral.     Neurological: Sensation diminshed to light touch to level of digits, Bilateral.  Protective sensation  sites via 5.07/10g Leisenring-Aubrey Monofilament, Bilateral.  negative Tinel's, Bilateral.  negative Valleix sign, Bilateral.      Integument: nails   thickened > 3.0 mm, dystrophic and crumbly, discolored with subungual debris. Toes cool to touch    Visual inspection:  Deformity: hammertoe deformity maría feet  amputation: absent    Edema:   Sensory exam:  Monofilament sensation: abnormal - 6/10 via SW 5.07/10g monofilament to the plantar foot bilateral feet    Visual inspection:  Deformity/amputation: absent  Skin lesions/pre-ulcerative calluses: absent  Edema: right- negative, left- negative    Sensory exam:  Monofilament sensation: normal  (minimum of 5 random plantar locations tested, avoiding callused areas - > 1 area with absence of sensation is + for neuropathy)    Plus at least one of the following:  Pulses: normal,   Pinprick: Intact  Proprioception: Intact  Vibration (128 Hz):  Intact DM with PVD       [x]Yes    []no    Foot Exam     Ortho Exam      Assessment:  59 y.o. female with:  Sabrina Shoemaker was seen today for new patient, referral - general, toe pain and diabetes. Diagnoses and all orders for this visit:    Encounter for diabetic foot exam ([facility] [address]    Tinea unguium  -      DIABETES FOOT EXAM    Pain in left toe(s)    Pain in toe of right foot    Other orders  -     ciclopirox (PENLAC) 8 % solution; Apply topically nightly. Q7   []Yes    []No                Q8   [x]Yes    []No                     Q9   []Yes    []No    Plan: Patient given a prescription for Penlac to be applied to the left hallux daily  Pt was evaluated and examined. Patient was given personalized discharge instructions. Nails left hallux. Were debrided sharply in length and thickness with a nipper and , without incident. Pt will follow up in 3 months or sooner if any problems arise. Diagnosis was discussed with the pt and all of their questions were answered in detail. Proper foot hygiene and care was discussed with the pt. Informed patient on proper diabetic foot care and importance of tight glycemic control. Patient to check feet daily and contact the office with any questions/problems/concerns. Other comorbidity noted and will be managed by PCP. @ED   It was discussed in detail with the patient proper hygiene for the diabetic foot. They are to get into a habit of looking at their feet or have someone look at them. If they are unable to daily, they are to look for any signs of redness, blistering, cracking, swelling, drainage, open lesions, etc.  They are to dry in-between the toes after each bath or shower gently. The water should be tested with their elbow to prevent burns. The patient is to refrain from soaking their feet unless instructed by myself to do otherwise. They are to refrain from going barefoot. Shoe gear should be inspected for any foreign objects.   Shoes should have a deep, wide toe box area. With any type of shoe, the feet should be inspected for any signs of pressure, i.e., redness, blistering, or open lesions. The patient was instructed to contact myself or other health care provider with any questions.    Electronically signed by Caitlin Luo DPM on 1/4/2023 at 8:27 AM  1/4/2023

## 2023-01-24 DIAGNOSIS — Z79.4 TYPE 2 DIABETES MELLITUS WITHOUT COMPLICATION, WITH LONG-TERM CURRENT USE OF INSULIN (HCC): ICD-10-CM

## 2023-01-24 DIAGNOSIS — E11.9 TYPE 2 DIABETES MELLITUS WITHOUT COMPLICATION, WITH LONG-TERM CURRENT USE OF INSULIN (HCC): ICD-10-CM

## 2023-01-24 RX ORDER — GLUCOSAMINE HCL/CHONDROITIN SU 500-400 MG
CAPSULE ORAL DAILY
Qty: 100 STRIP | Refills: 5 | Status: SHIPPED | OUTPATIENT
Start: 2023-01-24

## 2023-03-06 ENCOUNTER — TELEPHONE (OUTPATIENT)
Dept: FAMILY MEDICINE CLINIC | Age: 58
End: 2023-03-06

## 2023-03-06 DIAGNOSIS — E11.9 TYPE 2 DIABETES MELLITUS WITHOUT COMPLICATION, WITH LONG-TERM CURRENT USE OF INSULIN (HCC): ICD-10-CM

## 2023-03-06 DIAGNOSIS — Z79.4 TYPE 2 DIABETES MELLITUS WITHOUT COMPLICATION, WITH LONG-TERM CURRENT USE OF INSULIN (HCC): ICD-10-CM

## 2023-03-06 RX ORDER — GLUCOSAMINE HCL/CHONDROITIN SU 500-400 MG
1 CAPSULE ORAL DAILY
Qty: 100 STRIP | Refills: 5 | Status: SHIPPED | OUTPATIENT
Start: 2023-03-06

## 2023-03-06 NOTE — TELEPHONE ENCOUNTER
Pt called and stated she had a refill done for her Test Strips for her glucometer    She stated wrong strips called in she received test strips for one touch and she does not use     Trumetrix  is her glucose monitor    She is asking for correct strips to be called in [facility] in [place]

## 2023-03-09 ENCOUNTER — TELEPHONE (OUTPATIENT)
Dept: FAMILY MEDICINE CLINIC | Age: 58
End: 2023-03-09

## 2023-03-09 RX ORDER — BLOOD SUGAR DIAGNOSTIC
1 STRIP MISCELLANEOUS DAILY
Qty: 100 EACH | Refills: 3 | Status: SHIPPED | OUTPATIENT
Start: 2023-03-09

## 2023-03-09 NOTE — TELEPHONE ENCOUNTER
Tatyana needs a refill for her Truemetrix test strips sent to [facility] in [place]

## 2023-03-31 DIAGNOSIS — I10 PRIMARY HYPERTENSION: ICD-10-CM

## 2023-03-31 DIAGNOSIS — E11.8 TYPE 2 DIABETES MELLITUS WITH COMPLICATION, WITHOUT LONG-TERM CURRENT USE OF INSULIN (HCC): ICD-10-CM

## 2023-03-31 LAB
ALBUMIN SERPL-MCNC: 4 G/DL (ref 3.5–5.2)
ALP SERPL-CCNC: 75 U/L (ref 35–104)
ALT SERPL-CCNC: 22 U/L (ref 0–32)
ANION GAP SERPL CALCULATED.3IONS-SCNC: 11 MMOL/L (ref 7–16)
AST SERPL-CCNC: 19 U/L (ref 0–31)
BILIRUB SERPL-MCNC: 0.4 MG/DL (ref 0–1.2)
BUN SERPL-MCNC: 19 MG/DL (ref 6–20)
CALCIUM SERPL-MCNC: 9.4 MG/DL (ref 8.6–10.2)
CHLORIDE SERPL-SCNC: 101 MMOL/L (ref 98–107)
CHOLESTEROL, TOTAL: 166 MG/DL (ref 0–199)
CO2 SERPL-SCNC: 26 MMOL/L (ref 22–29)
CREAT SERPL-MCNC: 0.7 MG/DL (ref 0.5–1)
ERYTHROCYTE [DISTWIDTH] IN BLOOD BY AUTOMATED COUNT: 13.9 FL (ref 11.5–15)
GLUCOSE SERPL-MCNC: 194 MG/DL (ref 74–99)
HBA1C MFR BLD: 8.3 % (ref 4–5.6)
HCT VFR BLD AUTO: 40.7 % (ref 34–48)
HDLC SERPL-MCNC: 87 MG/DL
HGB BLD-MCNC: 12.7 G/DL (ref 11.5–15.5)
LDLC SERPL CALC-MCNC: 67 MG/DL (ref 0–99)
MCH RBC QN AUTO: 28 PG (ref 26–35)
MCHC RBC AUTO-ENTMCNC: 31.2 % (ref 32–34.5)
MCV RBC AUTO: 89.8 FL (ref 80–99.9)
PLATELET # BLD AUTO: 326 E9/L (ref 130–450)
PMV BLD AUTO: 10.3 FL (ref 7–12)
POTASSIUM SERPL-SCNC: 4.8 MMOL/L (ref 3.5–5)
PROT SERPL-MCNC: 6.6 G/DL (ref 6.4–8.3)
RBC # BLD AUTO: 4.53 E12/L (ref 3.5–5.5)
SODIUM SERPL-SCNC: 138 MMOL/L (ref 132–146)
TRIGL SERPL-MCNC: 61 MG/DL (ref 0–149)
VLDLC SERPL CALC-MCNC: 12 MG/DL
WBC # BLD: 7.3 E9/L (ref 4.5–11.5)

## 2023-05-25 ENCOUNTER — OFFICE VISIT (OUTPATIENT)
Dept: FAMILY MEDICINE CLINIC | Age: 58
End: 2023-05-25
Payer: COMMERCIAL

## 2023-05-25 VITALS
WEIGHT: 236.4 LBS | HEIGHT: 62 IN | HEART RATE: 67 BPM | RESPIRATION RATE: 16 BRPM | OXYGEN SATURATION: 98 % | BODY MASS INDEX: 43.5 KG/M2 | SYSTOLIC BLOOD PRESSURE: 128 MMHG | DIASTOLIC BLOOD PRESSURE: 70 MMHG | TEMPERATURE: 97.5 F

## 2023-05-25 DIAGNOSIS — Z12.31 SCREENING MAMMOGRAM FOR BREAST CANCER: ICD-10-CM

## 2023-05-25 DIAGNOSIS — Z01.419 WELL WOMAN EXAM WITH ROUTINE GYNECOLOGICAL EXAM: Primary | ICD-10-CM

## 2023-05-25 PROCEDURE — 99396 PREV VISIT EST AGE 40-64: CPT | Performed by: FAMILY MEDICINE

## 2023-05-25 SDOH — ECONOMIC STABILITY: FOOD INSECURITY: WITHIN THE PAST 12 MONTHS, THE FOOD YOU BOUGHT JUST DIDN'T LAST AND YOU DIDN'T HAVE MONEY TO GET MORE.: NEVER TRUE

## 2023-05-25 SDOH — ECONOMIC STABILITY: FOOD INSECURITY: WITHIN THE PAST 12 MONTHS, YOU WORRIED THAT YOUR FOOD WOULD RUN OUT BEFORE YOU GOT MONEY TO BUY MORE.: NEVER TRUE

## 2023-05-25 SDOH — ECONOMIC STABILITY: HOUSING INSECURITY
IN THE LAST 12 MONTHS, WAS THERE A TIME WHEN YOU DID NOT HAVE A STEADY PLACE TO SLEEP OR SLEPT IN A SHELTER (INCLUDING NOW)?: NO

## 2023-05-25 SDOH — ECONOMIC STABILITY: INCOME INSECURITY: HOW HARD IS IT FOR YOU TO PAY FOR THE VERY BASICS LIKE FOOD, HOUSING, MEDICAL CARE, AND HEATING?: NOT HARD AT ALL

## 2023-05-25 ASSESSMENT — PATIENT HEALTH QUESTIONNAIRE - PHQ9
SUM OF ALL RESPONSES TO PHQ9 QUESTIONS 1 & 2: 0
2. FEELING DOWN, DEPRESSED OR HOPELESS: 0
SUM OF ALL RESPONSES TO PHQ QUESTIONS 1-9: 0
1. LITTLE INTEREST OR PLEASURE IN DOING THINGS: 0
SUM OF ALL RESPONSES TO PHQ QUESTIONS 1-9: 0

## 2023-06-01 DIAGNOSIS — R87.618 BENIGN-APPEARING ENDOMETRIAL CELLS ON CERVICAL PAP SMEAR: Primary | ICD-10-CM

## 2023-07-03 ENCOUNTER — PROCEDURE VISIT (OUTPATIENT)
Dept: PODIATRY | Age: 58
End: 2023-07-03
Payer: COMMERCIAL

## 2023-07-03 VITALS
DIASTOLIC BLOOD PRESSURE: 82 MMHG | WEIGHT: 236 LBS | SYSTOLIC BLOOD PRESSURE: 133 MMHG | TEMPERATURE: 98.2 F | BODY MASS INDEX: 43.16 KG/M2

## 2023-07-03 DIAGNOSIS — R26.2 DIFFICULTY WALKING: ICD-10-CM

## 2023-07-03 DIAGNOSIS — M79.675 PAIN IN LEFT TOE(S): ICD-10-CM

## 2023-07-03 DIAGNOSIS — E11.9 ENCOUNTER FOR DIABETIC FOOT EXAM (HCC): ICD-10-CM

## 2023-07-03 DIAGNOSIS — B35.1 TINEA UNGUIUM: Primary | ICD-10-CM

## 2023-07-03 DIAGNOSIS — M79.674 PAIN IN TOE OF RIGHT FOOT: ICD-10-CM

## 2023-07-03 PROCEDURE — 11721 DEBRIDE NAIL 6 OR MORE: CPT | Performed by: PODIATRIST

## 2023-07-03 NOTE — PROGRESS NOTES
[address] [place] [address]  [address] PODIATRY  [address] [facility] [address]  Dept: [phone]  Dept Fax: [phone]  Loc: [phone]    DIABETIC NAIL PROGRESS NOTE  Date of patient's visit: 7/3/2023  Patient's Name:  Sally Cloud YOB: 1965            Patient Care Team:  Hernesto Stearns DO as PCP - General (Family Medicine)  Hernesto Stearns DO as PCP - Empaneled Provider          Chief Complaint   Patient presents with    Diabetes    Toe Pain     Rahel Wasserman DO  5/25/2023       Subjective:   Sally Cloud comes to clinic for Diabetes and Toe Pain (Rahel Wasserman, /5/25/2023)    she is a diabetic and states that diabetic foot exam.  This new patient is seen for fungus toenails and a diabetic foot exam.  Pt currently has complaint of thickened, elongated nails that they cannot manage by themselves. [facility]      Lab Results   Component Value Date    LABA1C 8.3 (H) 03/31/2023      Complains of numbness in the feet bilat. Past Medical History:   Diagnosis Date    Diabetes ([address])     Diabetes mellitus ([address])     Hypertension     Pancreatitis 1991       No Known Allergies  Current Outpatient Medications on File Prior to Visit   Medication Sig Dispense Refill    blood glucose test strips (TRUE METRIX PRO BLOOD GLUCOSE) strip 1 each by In Vitro route daily As needed.  100 each 3    blood glucose monitor strips 1 strip by Other route daily Daily    Trumetrix test strips 100 strip 5    metFORMIN (GLUCOPHAGE) 1000 MG tablet Take 1 tablet by mouth 2 times daily (with meals) 180 tablet 3    lisinopril-hydroCHLOROthiazide (PRINZIDE;ZESTORETIC) 20-25 MG per tablet Take 1 tablet by mouth daily 90 tablet 3    Blood Gluc Meter Disp-Strips (BLOOD GLUCOSE METER DISPOSABLE) FARHAD 1 Units by Does not apply route daily 1 each 0    blood glucose monitor supplies 1 each by Other route

## 2023-08-30 DIAGNOSIS — Z79.4 TYPE 2 DIABETES MELLITUS WITHOUT COMPLICATION, WITH LONG-TERM CURRENT USE OF INSULIN (HCC): ICD-10-CM

## 2023-08-30 DIAGNOSIS — E11.9 TYPE 2 DIABETES MELLITUS WITHOUT COMPLICATION, WITH LONG-TERM CURRENT USE OF INSULIN (HCC): ICD-10-CM

## 2023-08-30 RX ORDER — GLUCOSAMINE HCL/CHONDROITIN SU 500-400 MG
1 CAPSULE ORAL DAILY
Qty: 100 STRIP | Refills: 5 | Status: SHIPPED | OUTPATIENT
Start: 2023-08-30

## 2023-09-14 DIAGNOSIS — Z79.4 TYPE 2 DIABETES MELLITUS WITHOUT COMPLICATION, WITH LONG-TERM CURRENT USE OF INSULIN (HCC): ICD-10-CM

## 2023-09-14 DIAGNOSIS — E11.9 TYPE 2 DIABETES MELLITUS WITHOUT COMPLICATION, WITH LONG-TERM CURRENT USE OF INSULIN (HCC): ICD-10-CM

## 2023-09-14 RX ORDER — GLUCOSAMINE HCL/CHONDROITIN SU 500-400 MG
1 CAPSULE ORAL DAILY
Qty: 100 STRIP | Refills: 5 | Status: SHIPPED | OUTPATIENT
Start: 2023-09-14

## 2023-09-14 NOTE — TELEPHONE ENCOUNTER
Pt is calling stating she needs her glucose strips ordered. She uses Valdemar Wrightnifin. States she has been trying for a while. States she needs to be ordered asap because she needs to check her blood in the morning. Please Advise.

## 2023-09-26 RX ORDER — BLOOD SUGAR DIAGNOSTIC
1 STRIP MISCELLANEOUS DAILY
Qty: 100 EACH | Refills: 3 | Status: SHIPPED | OUTPATIENT
Start: 2023-09-26

## 2023-10-02 ENCOUNTER — PROCEDURE VISIT (OUTPATIENT)
Dept: PODIATRY | Age: 58
End: 2023-10-02
Payer: COMMERCIAL

## 2023-10-02 VITALS
SYSTOLIC BLOOD PRESSURE: 148 MMHG | WEIGHT: 236 LBS | BODY MASS INDEX: 43.16 KG/M2 | TEMPERATURE: 97.2 F | DIASTOLIC BLOOD PRESSURE: 84 MMHG

## 2023-10-02 DIAGNOSIS — M79.675 PAIN IN LEFT TOE(S): ICD-10-CM

## 2023-10-02 DIAGNOSIS — E11.9 ENCOUNTER FOR DIABETIC FOOT EXAM (HCC): ICD-10-CM

## 2023-10-02 DIAGNOSIS — R26.2 DIFFICULTY WALKING: ICD-10-CM

## 2023-10-02 DIAGNOSIS — M79.674 PAIN IN RIGHT TOE(S): ICD-10-CM

## 2023-10-02 DIAGNOSIS — B35.1 TINEA UNGUIUM: Primary | ICD-10-CM

## 2023-10-02 PROCEDURE — 11721 DEBRIDE NAIL 6 OR MORE: CPT | Performed by: PODIATRIST

## 2023-10-02 NOTE — PROGRESS NOTES
[address] [place] [address]  [address] PODIATRY  [address] [facility] [address]  Dept: [phone]  Dept Fax: Zoe: [phone]    DIABETIC NAIL PROGRESS NOTE  Date of patient's visit: 10/2/2023  Patient's Name:  Robert Hancock YOB: 1965            Patient Care Team:  Eduardo Stearns DO as PCP - General (Family Medicine)  Eduardo Stearns DO as PCP - Empaneled Provider          Chief Complaint   Patient presents with    Diabetes    Toe Pain     Rahel Wasserman DO  9/26/23       Subjective:   Robert Hancock comes to clinic for Diabetes and Toe Pain (Rahel Wasserman DO/9/26/23)    she is a diabetic and states that diabetic foot exam.   patient is seen for fungus toenails and a diabetic foot exam.  Pt currently has complaint of thickened, elongated nails that they cannot manage by themselves. Pt's primary care physician is Rahel Wasserman DO         Lab Results   Component Value Date    LABA1C 8.3 (H) 03/31/2023      Complains of numbness in the feet bilat. Past Medical History:   Diagnosis Date    Diabetes ([address]     Diabetes mellitus ([address] [facility])     Hypertension     Pancreatitis 1991       No Known Allergies  Current Outpatient Medications on File Prior to Visit   Medication Sig Dispense Refill    blood glucose test strips (TRUE METRIX PRO BLOOD GLUCOSE) strip 1 each by In Vitro route daily As needed.  100 each 3    blood glucose monitor strips 1 strip by Other route daily Daily    Trumetrix test strips 100 strip 5    metFORMIN (GLUCOPHAGE) 1000 MG tablet Take 1 tablet by mouth 2 times daily (with meals) 180 tablet 3    lisinopril-hydroCHLOROthiazide (PRINZIDE;ZESTORETIC) 20-25 MG per tablet Take 1 tablet by mouth daily 90 tablet 3    Blood Gluc Meter Disp-Strips (BLOOD GLUCOSE METER DISPOSABLE) FARHAD 1 Units by Does not apply route daily 1 each 0    blood glucose monitor supplies 1 each by Other route daily 1 each 0    Lancets Thin

## 2023-11-01 ENCOUNTER — OFFICE VISIT (OUTPATIENT)
Dept: FAMILY MEDICINE CLINIC | Age: 58
End: 2023-11-01
Payer: COMMERCIAL

## 2023-11-01 ENCOUNTER — HOSPITAL ENCOUNTER (OUTPATIENT)
Age: 58
Discharge: HOME | End: 2023-11-03
Payer: COMMERCIAL

## 2023-11-01 ENCOUNTER — HOSPITAL ENCOUNTER (OUTPATIENT)
Dept: GENERAL RADIOLOGY | Age: 58
Discharge: HOME | End: 2023-11-03
Payer: COMMERCIAL

## 2023-11-01 VITALS
BODY MASS INDEX: 43.92 KG/M2 | DIASTOLIC BLOOD PRESSURE: 84 MMHG | WEIGHT: 238.7 LBS | HEIGHT: 62 IN | TEMPERATURE: 97.5 F | HEART RATE: 69 BPM | SYSTOLIC BLOOD PRESSURE: 136 MMHG | RESPIRATION RATE: 18 BRPM | OXYGEN SATURATION: 96 %

## 2023-11-01 DIAGNOSIS — M25.531 RIGHT WRIST PAIN: ICD-10-CM

## 2023-11-01 DIAGNOSIS — Z00.00 ANNUAL PHYSICAL EXAM: Primary | ICD-10-CM

## 2023-11-01 DIAGNOSIS — Z12.11 SPECIAL SCREENING FOR MALIGNANT NEOPLASM OF COLON: ICD-10-CM

## 2023-11-01 DIAGNOSIS — E11.9 TYPE 2 DIABETES MELLITUS WITHOUT COMPLICATION, WITHOUT LONG-TERM CURRENT USE OF INSULIN (HCC): ICD-10-CM

## 2023-11-01 PROCEDURE — 99396 PREV VISIT EST AGE 40-64: CPT | Performed by: FAMILY MEDICINE

## 2023-11-01 PROCEDURE — 73110 X-RAY EXAM OF WRIST: CPT

## 2023-11-01 NOTE — PROGRESS NOTES
to assure aware of all possible risks and side effects of medication before taking. Reviewed age and gender appropriate health screening exams and vaccinations. Advised patient regarding importance of keeping up with recommended health maintenance and to schedule as soon as possible if overdue, as this is important in assessing for undiagnosed pathology, especially cancer, as well as protecting against potentially harmful/life threatening disease. Patient and/or guardian verbalizes understanding and agrees with above counseling, assessment and plan. All questions answered.

## 2023-11-21 ENCOUNTER — TELEPHONE (OUTPATIENT)
Dept: FAMILY MEDICINE CLINIC | Age: 58
End: 2023-11-21

## 2023-12-15 DIAGNOSIS — E11.9 TYPE 2 DIABETES MELLITUS WITHOUT COMPLICATION, WITHOUT LONG-TERM CURRENT USE OF INSULIN (HCC): ICD-10-CM

## 2023-12-15 DIAGNOSIS — Z00.00 ANNUAL PHYSICAL EXAM: ICD-10-CM

## 2023-12-15 LAB
ALBUMIN SERPL-MCNC: 4.2 G/DL (ref 3.5–5.2)
ALP BLD-CCNC: 77 U/L (ref 35–104)
ALT SERPL-CCNC: 21 U/L (ref 0–32)
ANION GAP SERPL CALCULATED.3IONS-SCNC: 15 MMOL/L (ref 7–16)
AST SERPL-CCNC: 23 U/L (ref 0–31)
BILIRUB SERPL-MCNC: 0.4 MG/DL (ref 0–1.2)
BUN BLDV-MCNC: 17 MG/DL (ref 6–23)
CALCIUM SERPL-MCNC: 9.2 MG/DL (ref 8.6–10.2)
CHLORIDE BLD-SCNC: 101 MMOL/L (ref 98–107)
CHOLESTEROL: 159 MG/DL
CO2: 23 MMOL/L (ref 22–29)
CREAT SERPL-MCNC: 0.7 MG/DL (ref 0.5–1)
GFR SERPL CREATININE-BSD FRML MDRD: >60 ML/MIN/1.73M2
GLUCOSE BLD-MCNC: 156 MG/DL (ref 74–99)
HBA1C MFR BLD: 9 % (ref 4–5.6)
HCT VFR BLD CALC: 40.2 % (ref 34–48)
HDLC SERPL-MCNC: 78 MG/DL
HEMOGLOBIN: 12.9 G/DL (ref 11.5–15.5)
LDL CHOLESTEROL: 69 MG/DL
MCH RBC QN AUTO: 28.4 PG (ref 26–35)
MCHC RBC AUTO-ENTMCNC: 32.1 G/DL (ref 32–34.5)
MCV RBC AUTO: 88.5 FL (ref 80–99.9)
PDW BLD-RTO: 13.6 % (ref 11.5–15)
PLATELET # BLD: 304 K/UL (ref 130–450)
PMV BLD AUTO: 10.5 FL (ref 7–12)
POTASSIUM SERPL-SCNC: 4.4 MMOL/L (ref 3.5–5)
RBC # BLD: 4.54 M/UL (ref 3.5–5.5)
SODIUM BLD-SCNC: 139 MMOL/L (ref 132–146)
TOTAL PROTEIN: 7 G/DL (ref 6.4–8.3)
TRIGL SERPL-MCNC: 61 MG/DL
VLDLC SERPL CALC-MCNC: 12 MG/DL
WBC # BLD: 8.1 K/UL (ref 4.5–11.5)

## 2023-12-28 ENCOUNTER — HOSPITAL ENCOUNTER (OUTPATIENT)
Age: 58
Discharge: HOME | End: 2023-12-28
Payer: COMMERCIAL

## 2023-12-28 ENCOUNTER — OFFICE VISIT (OUTPATIENT)
Dept: OBGYN | Age: 58
End: 2023-12-28
Payer: COMMERCIAL

## 2023-12-28 VITALS
HEART RATE: 68 BPM | DIASTOLIC BLOOD PRESSURE: 83 MMHG | WEIGHT: 233.7 LBS | BODY MASS INDEX: 43 KG/M2 | HEIGHT: 62 IN | SYSTOLIC BLOOD PRESSURE: 134 MMHG

## 2023-12-28 DIAGNOSIS — R87.618 NON-ATYPICAL ENDOMETRIAL CELLS OF CERVIX ON PAP SMEAR: ICD-10-CM

## 2023-12-28 DIAGNOSIS — N95.0 POSTMENOPAUSAL BLEEDING: Primary | ICD-10-CM

## 2023-12-28 LAB
FSH SERPL-ACNC: 36.1 MIU/ML
T4 FREE SERPL-MCNC: 1.5 NG/DL (ref 0.9–1.7)
TSH SERPL DL<=0.05 MIU/L-ACNC: 1.6 UIU/ML (ref 0.27–4.2)

## 2023-12-28 PROCEDURE — 99203 OFFICE O/P NEW LOW 30 MIN: CPT | Performed by: OBSTETRICS & GYNECOLOGY

## 2023-12-28 PROCEDURE — 36415 COLL VENOUS BLD VENIPUNCTURE: CPT

## 2023-12-28 PROCEDURE — 83001 ASSAY OF GONADOTROPIN (FSH): CPT

## 2023-12-28 PROCEDURE — 84443 ASSAY THYROID STIM HORMONE: CPT

## 2023-12-28 PROCEDURE — 84439 ASSAY OF FREE THYROXINE: CPT

## 2023-12-28 NOTE — PROGRESS NOTES
New patient here to establish care. Last pap 5/25/23. Referred by Dr Mel Le per note has benign appearing endometrial cells. Mammogram 8/3/23. LMP 12/20/23. Patient states cycles are very light and only a day or two.

## 2023-12-28 NOTE — PROGRESS NOTES
HISTORY OF PRESENT ILLNESS:    Pt presents to [facility] care. Referred by Dr. Ha Talavera. Pt admits to spotting monthly. Usually lasts 1-2 days. No pain. Pt reports never stopping cycles. Pt c/o hotflashes. It is worst for her at work. Most recent hgbA1C was 9.     2023 pap-endometrial cells, neg for dysplasia  No history of abnormal paps. Pt admits to history of heavy menstrual bleeding. Pt had D&C and had blood transfusion. This was 30 years ago. Pt reports normal cycles after that. Past Medical History:   Past Medical History:   Diagnosis Date    Diabetes ([address] [facility])     Hypertension     Pancreatitis 1991    gallstones                                             OB History    Para Term  AB Living   4 4 3     2   SAB IAB Ectopic Molar Multiple Live Births             4      # Outcome Date GA Lbr Charles/2nd Weight Sex Delivery Anes PTL Lv   4 Term 86    F Vag-Spont   DEC   3 Term 84    F Vag-Spont   DELBERT   2 Para 82    M Vag-Spont   DELBERT   1 Term      Vag-Spont   ND         Past Surgical History:   Past Surgical History:   Procedure Laterality Date    CHOLECYSTECTOMY  1991    gallstones, laparoscope    DILATION AND CURETTAGE OF UTERUS  1995        Allergies: Patient has no known allergies. Medications:   Current Outpatient Medications   Medication Sig Dispense Refill    blood glucose test strips (TRUE METRIX PRO BLOOD GLUCOSE) strip 1 each by In Vitro route daily As needed.  100 each 3    blood glucose monitor strips 1 strip by Other route daily Daily    Trumetrix test strips 100 strip 5    metFORMIN (GLUCOPHAGE) 1000 MG tablet Take 1 tablet by mouth 2 times daily (with meals) 180 tablet 3    lisinopril-hydroCHLOROthiazide (PRINZIDE;ZESTORETIC) 20-25 MG per tablet Take 1 tablet by mouth daily 90 tablet 3    Blood Gluc Meter Disp-Strips (BLOOD GLUCOSE METER DISPOSABLE) FARHAD 1 Units by Does not apply route daily 1 each 0    blood glucose monitor supplies 1 each

## 2024-01-04 ENCOUNTER — OFFICE VISIT (OUTPATIENT)
Dept: FAMILY MEDICINE CLINIC | Age: 59
End: 2024-01-04
Payer: COMMERCIAL

## 2024-01-04 VITALS
OXYGEN SATURATION: 95 % | SYSTOLIC BLOOD PRESSURE: 127 MMHG | RESPIRATION RATE: 16 BRPM | DIASTOLIC BLOOD PRESSURE: 81 MMHG | WEIGHT: 238 LBS | HEART RATE: 73 BPM | HEIGHT: 62 IN | BODY MASS INDEX: 43.79 KG/M2 | TEMPERATURE: 97.2 F

## 2024-01-04 DIAGNOSIS — R09.89 CHEST CONGESTION: Primary | ICD-10-CM

## 2024-01-04 DIAGNOSIS — J40 SINOBRONCHITIS: ICD-10-CM

## 2024-01-04 DIAGNOSIS — J32.9 SINOBRONCHITIS: ICD-10-CM

## 2024-01-04 PROCEDURE — 87428 SARSCOV & INF VIR A&B AG IA: CPT | Performed by: FAMILY MEDICINE

## 2024-01-04 PROCEDURE — 99213 OFFICE O/P EST LOW 20 MIN: CPT | Performed by: FAMILY MEDICINE

## 2024-01-04 PROCEDURE — 87880 STREP A ASSAY W/OPTIC: CPT | Performed by: FAMILY MEDICINE

## 2024-01-04 RX ORDER — AMOXICILLIN 500 MG/1
500 CAPSULE ORAL 2 TIMES DAILY
Qty: 20 CAPSULE | Refills: 0 | Status: SHIPPED | OUTPATIENT
Start: 2024-01-04 | End: 2024-01-14

## 2024-01-04 ASSESSMENT — PATIENT HEALTH QUESTIONNAIRE - PHQ9
SUM OF ALL RESPONSES TO PHQ QUESTIONS 1-9: 0
SUM OF ALL RESPONSES TO PHQ QUESTIONS 1-9: 0
2. FEELING DOWN, DEPRESSED OR HOPELESS: 0
SUM OF ALL RESPONSES TO PHQ QUESTIONS 1-9: 0
1. LITTLE INTEREST OR PLEASURE IN DOING THINGS: 0
SUM OF ALL RESPONSES TO PHQ QUESTIONS 1-9: 0
SUM OF ALL RESPONSES TO PHQ9 QUESTIONS 1 & 2: 0

## 2024-01-04 NOTE — PROGRESS NOTES
1/4/2024    Chief Complaint   Patient presents with    Facial Pain    Congestion    Cough     SS for 1 week with no relief        HPI    Tatyana Seymour is a 58 y.o. patient that presents today with cold symptoms.    Upper Respiratory Infection:  Patient complains of congestion, cough, and facial pain . Onset of symptoms was many days ago, gradually worsening since that time.      Patient's past medical, surgical, social and/or family history reviewed, updated in chart, and are non-contributory (unless otherwise stated).  Medications and allergies also reviewed and updated in chart.     ROS negative unless otherwise specified    Physical Exam  /81   Pulse 73   Temp 97.2 °F (36.2 °C) (Temporal)   Resp 16   Ht 1.575 m (5' 2\")   Wt 108 kg (238 lb)   LMP 12/20/2023   SpO2 95%   BMI 43.53 kg/m²     Wt Readings from Last 3 Encounters:   01/04/24 108 kg (238 lb)   12/28/23 106 kg (233 lb 11.2 oz)   11/01/23 108.3 kg (238 lb 11.2 oz)     BP Readings from Last 3 Encounters:   01/04/24 127/81   12/28/23 134/83   11/01/23 136/84         General appearance: alert, well appearing, and in no distress, oriented to person, place, and time and normal appearing weight.    HEENT:  HEAD: Atraumatic, normocephalic  EYES: PERRL  EOM intact  EARS: bilateral TM's and external ear canals normal  NOSE: nasal mucosa, septum, turbinates normal bilaterally  MOUTH: mucous membranes moist and normal tonsils  NECK: supple, full range of motion, no mass, normal lymphadenopathy, no thyromegaly    CVS exam: normal rate, regular rhythm, normal S1, S2, no murmurs, rubs, clicks or gallops.  Radial pulses 2+ bilateral.  PT/DP pulse 2+ bilat.  No C/C/E    Chest: clear to auscultation, no wheezes, rales or rhonchi, symmetric air entry.     SKIN: no lesions, jaundice, petechiae, pallor, cyanosis, ecchymosis        Assessment/Plan  Tatyana was seen today for facial pain, congestion and cough.    Diagnoses and all orders for this

## 2024-01-19 ENCOUNTER — HOSPITAL ENCOUNTER (OUTPATIENT)
Dept: ULTRASOUND IMAGING | Age: 59
Discharge: HOME | End: 2024-01-19
Attending: OBSTETRICS & GYNECOLOGY
Payer: COMMERCIAL

## 2024-01-19 DIAGNOSIS — N95.0 POSTMENOPAUSAL BLEEDING: ICD-10-CM

## 2024-01-19 PROCEDURE — 76830 TRANSVAGINAL US NON-OB: CPT

## 2024-01-29 ENCOUNTER — TELEPHONE (OUTPATIENT)
Dept: OBGYN | Age: 59
End: 2024-01-29

## 2024-01-29 NOTE — TELEPHONE ENCOUNTER
----- Message from Ayah Ivory DO sent at 1/29/2024  9:51 AM EST -----  Pt has thickened endometrium on US. Please schedule her for a biopsy in the next couple weeks. Thank you.

## 2024-02-01 ENCOUNTER — OFFICE VISIT (OUTPATIENT)
Dept: FAMILY MEDICINE CLINIC | Age: 59
End: 2024-02-01
Payer: COMMERCIAL

## 2024-02-01 VITALS
DIASTOLIC BLOOD PRESSURE: 70 MMHG | RESPIRATION RATE: 16 BRPM | HEART RATE: 57 BPM | HEIGHT: 62 IN | WEIGHT: 232.9 LBS | TEMPERATURE: 97.1 F | SYSTOLIC BLOOD PRESSURE: 122 MMHG | OXYGEN SATURATION: 99 % | BODY MASS INDEX: 42.86 KG/M2

## 2024-02-01 DIAGNOSIS — E78.2 MIXED HYPERLIPIDEMIA: Primary | ICD-10-CM

## 2024-02-01 DIAGNOSIS — E11.8 TYPE 2 DIABETES MELLITUS WITH COMPLICATION, WITHOUT LONG-TERM CURRENT USE OF INSULIN (HCC): ICD-10-CM

## 2024-02-01 DIAGNOSIS — I10 PRIMARY HYPERTENSION: ICD-10-CM

## 2024-02-01 PROCEDURE — 99214 OFFICE O/P EST MOD 30 MIN: CPT | Performed by: FAMILY MEDICINE

## 2024-02-01 PROCEDURE — 3078F DIAST BP <80 MM HG: CPT | Performed by: FAMILY MEDICINE

## 2024-02-01 PROCEDURE — 3074F SYST BP LT 130 MM HG: CPT | Performed by: FAMILY MEDICINE

## 2024-02-01 RX ORDER — LISINOPRIL AND HYDROCHLOROTHIAZIDE 25; 20 MG/1; MG/1
1 TABLET ORAL DAILY
Qty: 90 TABLET | Refills: 3 | Status: SHIPPED | OUTPATIENT
Start: 2024-02-01

## 2024-02-01 NOTE — PROGRESS NOTES
2/21/2024    Chief Complaint   Patient presents with    3 Month Follow-Up       Diabetes Mellitus Type II, Follow-up: Patient here for follow-up of Type 2 diabetes mellitus. This is a longstanding problem. Is taking all medications as prescribed and is tolerating well. Has been monitoring blood glucose at home.     Lab Results   Component Value Date    LABA1C 9.0 (H) 12/15/2023    LABA1C 8.3 (H) 03/31/2023    LABA1C 8.7 07/07/2022     Lab Results   Component Value Date    LDLCALC 67 03/31/2023    CREATININE 0.7 12/15/2023        Microalbumin: Microalbumen/Creatinine ratio:  No components found for: \"RUCREAT\"     Hypertension: Patient is here for follow up chronic hypertension. Patient is  compliant with lifestyle modifications like exercising and adherence to a low salt diet.   Patient denies chest pain, diaphoresis, dyspnea, dyspnea on exertion, peripheral edema, palpitations, HA, visual issues. See List for current meds. Taking as prescribed. No adverse effects.    Hyperlipidemia:  Patient presents with hyperlipidemia. Is asymptomatic. This is a chronic problem. Patient is  well controlled, as reviewed and seen on most recent labs. Compliance with treatment thus far has been adequate.  No adverse effects.       Patient's past medical, surgical, social and/or family history reviewed, updated in chart, and are non-contributory (unless otherwise stated).  Medications and allergies also reviewed and updated in chart.    ROS negative unless otherwise specified      Physical Exam  Wt Readings from Last 3 Encounters:   02/15/24 105.4 kg (232 lb 6.4 oz)   02/01/24 105.6 kg (232 lb 14.4 oz)   01/04/24 108 kg (238 lb)     Temp Readings from Last 3 Encounters:   02/01/24 97.1 °F (36.2 °C) (Temporal)   01/04/24 97.2 °F (36.2 °C) (Temporal)   11/01/23 97.5 °F (36.4 °C) (Temporal)     BP Readings from Last 3 Encounters:   02/15/24 128/75   02/01/24 122/70   01/04/24 127/81     Pulse Readings from Last 3 Encounters:

## 2024-02-14 ENCOUNTER — TELEPHONE (OUTPATIENT)
Dept: FAMILY MEDICINE CLINIC | Age: 59
End: 2024-02-14

## 2024-02-14 NOTE — TELEPHONE ENCOUNTER
Pt called in stating that she is not going to take Januvia because it gives her headaches and diarrhea. States that she is unable to tolerate this medication and will no longer take it.

## 2024-02-15 ENCOUNTER — PROCEDURE VISIT (OUTPATIENT)
Dept: OBGYN | Age: 59
End: 2024-02-15
Payer: COMMERCIAL

## 2024-02-15 VITALS
DIASTOLIC BLOOD PRESSURE: 75 MMHG | BODY MASS INDEX: 42.76 KG/M2 | HEIGHT: 62 IN | HEART RATE: 65 BPM | SYSTOLIC BLOOD PRESSURE: 128 MMHG | WEIGHT: 232.4 LBS

## 2024-02-15 DIAGNOSIS — N95.0 POSTMENOPAUSAL BLEEDING: Primary | ICD-10-CM

## 2024-02-15 PROCEDURE — 99214 OFFICE O/P EST MOD 30 MIN: CPT | Performed by: OBSTETRICS & GYNECOLOGY

## 2024-02-15 NOTE — PATIENT INSTRUCTIONS
Patient Education        Endometrial Biopsy: About This Test  What is it?     An endometrial biopsy is a way for your doctor to take a small sample of the lining of the uterus (endometrium). The sample is looked at under a microscope for abnormal cells. An endometrial biopsy helps your doctor find problems in the endometrium.  Why is this test done?  An endometrial biopsy is done to check for abnormal cells in the lining of the uterus (endometrium). It checks for precancerous and cancerous cells. It may be done if you are at higher risk for uterine cancer or have symptoms of uterine cancer, such as abnormal bleeding from your uterus.  How do you prepare for the test?  If you take aspirin or some other blood thinner, ask your doctor if you should stop taking it before your test. Make sure that you understand exactly what your doctor wants you to do. These medicines increase the risk of bleeding.  Ask your doctor if you should take a pain reliever, such as ibuprofen (Advil or Motrin), 30 to 60 minutes before the test. This can help reduce any cramping pain that the test can cause.  How is the test done?  You will lie on an exam table. Your feet will be in footrests.  The doctor will use a tool called a speculum to see the cervix.  The doctor may use a spray or injection to numb your cervix. The cervix is the opening to the uterus.  Then the doctor will pass a thin tube through the cervix to take a sample of the uterus lining.  The sample is sent to a lab.  How long does the test take?  The test will take about 5 to 15 minutes.  What happens after the test?  You will probably be able to go home right away.  You likely will have mild vaginal bleeding and may have cramps for a few days after the test. The cramps may feel like menstrual cramps.  How can you care for yourself at home?  Ask your doctor if you can take an over-the-counter pain medicine, such as acetaminophen (Tylenol), ibuprofen (Advil, Motrin), or naproxen

## 2024-02-15 NOTE — PROGRESS NOTES
Endometrial biopsy Procedure Note    Tatyana Seymour    2/15/2024               Patient's last menstrual period was 2023.     58 y.o.     Endometrial biopsy    Indications:  PMB  Pt presents to [facility] care. Referred by Dr. Darshan Stearns. Pt admits to spotting monthly. Usually lasts 1-2 days. No pain. Pt reports never stopping cycles.      Pt c/o hotflashes. It is worst for her at work.   Most recent hgbA1C was 9.      2023 pap-endometrial cells, neg for dysplasia  No history of abnormal paps.  Pt admits to history of heavy menstrual bleeding. Pt had D&C and had blood transfusion. This was 30 years ago. Pt reports normal cycles after that.     TSH, T4 wnl normal.  FSH menopausal  US showed ES 24 mm with cystic changes. Uterus 11 cm.      Anesthesia:  None    Procedural Technique:  Tatyana is here for an endometrial biopsy. Risks and benefits discussed and consents signed.       Endometrial biopsy:    The patient was positioned comfortably on the exam table in the dorsal lithotomy position. Speculum was placed in the vagina. The cervix was visualized, cleansed with betadine, and grasped w/ a single tooth tenaculum. Endometrial biopsy was performed with the [place].  Tissue was obtained and sent to pathology. Adequate tissue noted in the container. Excellent hemostasis was noted. The patient tolerated the procedure well.       Complications:  None.    Assessment:   Diagnosis Orders   1. Postmenopausal bleeding  Surgical Pathology    Surgical Pathology            Recommendations:  The patient will return for follow-up in 2 weeks for results review.  Patient to call for any heavy bleeding, severe pain, or fevers.

## 2024-02-20 LAB — SURGICAL PATHOLOGY REPORT: NORMAL

## 2024-02-29 ENCOUNTER — OFFICE VISIT (OUTPATIENT)
Dept: OBGYN | Age: 59
End: 2024-02-29
Payer: COMMERCIAL

## 2024-02-29 VITALS
DIASTOLIC BLOOD PRESSURE: 84 MMHG | WEIGHT: 231.5 LBS | HEART RATE: 54 BPM | BODY MASS INDEX: 42.34 KG/M2 | SYSTOLIC BLOOD PRESSURE: 130 MMHG

## 2024-02-29 DIAGNOSIS — N95.0 POSTMENOPAUSAL BLEEDING: Primary | ICD-10-CM

## 2024-02-29 DIAGNOSIS — R93.89 THICKENED ENDOMETRIUM: ICD-10-CM

## 2024-02-29 DIAGNOSIS — R87.618 NON-ATYPICAL ENDOMETRIAL CELLS OF CERVIX ON PAP SMEAR: ICD-10-CM

## 2024-02-29 PROCEDURE — 99213 OFFICE O/P EST LOW 20 MIN: CPT | Performed by: OBSTETRICS & GYNECOLOGY

## 2024-02-29 PROCEDURE — 99212 OFFICE O/P EST SF 10 MIN: CPT | Performed by: OBSTETRICS & GYNECOLOGY

## 2024-02-29 NOTE — PROGRESS NOTES
HISTORY OF PRESENT ILLNESS:    Pt presents for follow up for results.  Referred by Dr. Darshan Stearns. Pt admits to spotting monthly. Usually lasts 1-2 days. No pain. Pt reports never stopping cycles.      Pt c/o hotflashes. It is worst for her at work.   Most recent hgbA1C was 9.      2023 pap-endometrial cells, neg for dysplasia  No history of abnormal paps.  Pt admits to history of heavy menstrual bleeding. Pt had D&C and had blood transfusion. This was 30 years ago. Pt reports normal cycles after that.      TSH, T4 wnl normal.  FSH menopausal  US showed ES 24 mm with cystic changes. Uterus 11 cm.   Endometrial biopsy benign.    Past Medical History:   Past Medical History:   Diagnosis Date    Diabetes (HCC)     Hypertension     Pancreatitis 1991    gallstones                                             OB History    Para Term  AB Living   4 4 3     2   SAB IAB Ectopic Molar Multiple Live Births             4      # Outcome Date GA Lbr Charles/2nd Weight Sex Delivery Anes PTL Lv   4 Term 88    F Vag-Spont   DEC   3 Term 86    F Vag-Spont   DELBERT   2 Para 84    M Vag-Spont   DELBERT   1 Term      Vag-Spont   ND         Past Surgical History:   Past Surgical History:   Procedure Laterality Date    CHOLECYSTECTOMY  1991    gallstones, laparoscope    DILATION AND CURETTAGE OF UTERUS  1995        Allergies: Patient has no known allergies.     Medications:   Current Outpatient Medications   Medication Sig Dispense Refill    lisinopril-hydroCHLOROthiazide (PRINZIDE;ZESTORETIC) 20-25 MG per tablet Take 1 tablet by mouth daily 90 tablet 3    metFORMIN (GLUCOPHAGE) 1000 MG tablet Take 1 tablet by mouth 2 times daily (with meals) 180 tablet 3    blood glucose test strips (TRUE METRIX PRO BLOOD GLUCOSE) strip 1 each by In Vitro route daily As needed. 100 each 3    blood glucose monitor strips 1 strip by Other route daily Daily    Trumetrix test strips 100 strip 5    Blood Gluc Meter

## 2024-03-07 ENCOUNTER — TELEPHONE (OUTPATIENT)
Dept: OBGYN | Age: 59
End: 2024-03-07

## 2024-03-07 NOTE — TELEPHONE ENCOUNTER
Surgery scheduled 4/3/24 @ 12 PM   [facility]  Pre-op appointment 3/28/24 @9:30 AM  Patient made aware of surgery date and time.  Patient voiced understanding

## 2024-03-28 ENCOUNTER — OFFICE VISIT (OUTPATIENT)
Dept: OBGYN | Age: 59
End: 2024-03-28
Payer: COMMERCIAL

## 2024-03-28 VITALS
HEART RATE: 71 BPM | DIASTOLIC BLOOD PRESSURE: 86 MMHG | BODY MASS INDEX: 42.42 KG/M2 | SYSTOLIC BLOOD PRESSURE: 125 MMHG | WEIGHT: 231.9 LBS

## 2024-03-28 DIAGNOSIS — N95.0 POSTMENOPAUSAL BLEEDING: Primary | ICD-10-CM

## 2024-03-28 DIAGNOSIS — R93.89 THICKENED ENDOMETRIUM: ICD-10-CM

## 2024-03-28 PROCEDURE — 99214 OFFICE O/P EST MOD 30 MIN: CPT | Performed by: OBSTETRICS & GYNECOLOGY

## 2024-03-28 PROCEDURE — 99213 OFFICE O/P EST LOW 20 MIN: CPT | Performed by: OBSTETRICS & GYNECOLOGY

## 2024-03-28 NOTE — PROGRESS NOTES
Patient alert and pleasant with no new complaints  Here today for pre-op appt.  All pre-op information given to patient.  Date,time and place.   Patient voiced understanding.  Discharge instructions have been discussed with the patient. Patient advised to call our office with any questions or concerns.   Voiced understanding.   
Stability Vital Sign     Unstable Housing in the Last Year: No       Family History:       Problem Relation Age of Onset    Thyroid Disease Mother         Hyperthyroid    Heart Disease Father     High Blood Pressure Father     Diabetes Father     Thyroid Disease Sister         Hypothyroid    Diabetes Maternal Grandmother     Cancer Maternal Grandmother         Non-Hodgkins Lymphoma    Diabetes Maternal Grandfather     High Blood Pressure Maternal Grandfather     Cancer Maternal Grandfather         lung    Cancer Paternal Grandmother         uterine    Cancer Paternal Grandfather         lung    Brain Cancer Daughter 2       REVIEW OF SYSTEMS:      Constitutional:  negative  Gastrointestinal:  negative  Genitourinary:  positive for postmenopausal bleeding  Integument/breast:  negative  Hematologic/lymphatic:  negative  Endocrine:  negative  Behavior/Psych:  negative    PHYSICAL EXAM:    Vitals:  /86 (Position: Sitting)   Pulse 71   Wt 105.2 kg (231 lb 14.4 oz)   LMP 12/20/2023   BMI 42.42 kg/m²     Gen A/O x3  Heart RRR  Lungs Clear  Abd Soft, NT, ND, +BS  Ext No edema  Pelvic Will examine in OR.      ASSESSMENT AND PLAN:    PMB  Thickened endometrium    To OR for diagnostic hysteroscopy, dilation and curettage, possible myosure polypectomy      Ayah Ivory,  3/28/2024 9:45 AM

## 2024-03-28 NOTE — PROGRESS NOTES
[facility] PRE-ADMISSION TESTING INSTRUCTIONS    The Preadmission Testing patient is instructed accordingly using the following criteria (check applicable):    ARRIVAL INSTRUCTIONS:  [x] Parking the day of Surgery is located in the Main Entrance lot.  Upon entering the door, make an immediate right to the surgery reception desk    [x] Bring photo ID and insurance card    [x] Bring in a copy of Living will or Durable Power of  papers.    [x] Please be sure to arrange for a responsible adult to provide transportation to and from the hospital    [x] Please arrange for a responsible adult to be with you for the 24 hour period post procedure due to having anesthesia    [x] If you awake am of surgery not feeling well or have temperature >100 please call [phone]    GENERAL INSTRUCTIONS:    [x] No solid foods after midnight, may have up to 8oz of water until 4 hours prior to surgery. No gum, no candy, no mints. NPO time: 0700       [x] You may brush your teeth, do not swallow any toothpaste    [] Take medications as instructed     [x] No herbal supplements and vitamins 5 days prior to procedure    [] Follow preop dosing of blood thinners per physician instructions    [] Take 1/2 dose of evening insulin, but no insulin after midnight    [] No oral diabetic medications after midnight    [] If diabetic and have low blood sugar or feel symptomatic, take 1-2oz apple juice only    [] Bring inhalers day of surgery    [] Bring urine specimen day of surgery    [x] Shower or bath with soap, lather and rinse well, AM of Surgery, no lotion, powders or creams to surgical site    [] Follow bowel prep as instructed per surgeon    [x] No tobacco products within 24 hours of surgery     [x] No alcohol or illegal drug use, marijuana included, within 24 hours of surgery.    [x] Jewelry, body piercing's, eyeglasses, contact lenses and dentures are not permitted into surgery (bring cases)      [x] Please  do not wear any nail polish, make up or hair products on the day of surgery    [x] You can expect a call the business day prior to procedure to notify you if your arrival time changes    [x] If you receive a survey after surgery we would greatly appreciate your comments    [] Parent/guardian of a minor must accompany their child and remain on the premises  the entire time they are under our care     [] Pediatric patients may bring favorite toy, blanket or comfort item with them    [] A caregiver or family member must remain with the patient during their stay if they are mentally handicapped, have dementia, disoriented or unable to use a call light or would be a safety concern if left unattended    [x] Please notify surgeon if you develop any illness between now and time of surgery (cold, cough, sore throat, fever, nausea, vomiting) or any signs of infections  including skin, wounds, and dental.    [x]  The Outpatient Pharmacy is available to fill your prescription here on your day of surgery, ask your preop nurse for details    [] Other instructions    EDUCATIONAL MATERIALS PROVIDED:    [] PAT Preoperative Education Packet/Booklet     [] Medication List    [] Transfusion bracelet applied with instructions    [] Shower with soap, lather and rinse well, and use CHG wipes provided the evening before surgery as instructed    [] Incentive spirometer with instructions

## 2024-04-01 ENCOUNTER — PROCEDURE VISIT (OUTPATIENT)
Dept: PODIATRY | Age: 59
End: 2024-04-01
Payer: COMMERCIAL

## 2024-04-01 VITALS
DIASTOLIC BLOOD PRESSURE: 78 MMHG | WEIGHT: 231 LBS | BODY MASS INDEX: 42.25 KG/M2 | SYSTOLIC BLOOD PRESSURE: 134 MMHG | TEMPERATURE: 97.1 F

## 2024-04-01 DIAGNOSIS — Q82.8 POROKERATOSIS: ICD-10-CM

## 2024-04-01 DIAGNOSIS — B35.1 TINEA UNGUIUM: ICD-10-CM

## 2024-04-01 DIAGNOSIS — E11.9 ENCOUNTER FOR DIABETIC FOOT EXAM (HCC): Primary | ICD-10-CM

## 2024-04-01 DIAGNOSIS — M79.675 PAIN IN LEFT TOE(S): ICD-10-CM

## 2024-04-01 DIAGNOSIS — M79.674 PAIN IN RIGHT TOE(S): ICD-10-CM

## 2024-04-01 PROCEDURE — 99212 OFFICE O/P EST SF 10 MIN: CPT | Performed by: PODIATRIST

## 2024-04-01 NOTE — PROGRESS NOTES
[facility] [place] [facility]  [address] [address]  Dept: [phone]  Dept Fax: [phone]  Loc: [phone]    DIABETIC NAIL PROGRESS NOTE  Date of patient's visit: 4/1/2024  Patient's Name:  Tatyana Seymour YOB: 1965            Patient Care Team:  Rahel Zamarripa DO as PCP - General (Family Medicine)  Rahel Zamarripa DO as PCP - Empaneled Provider          Chief Complaint   Patient presents with    Diabetes     Dm foot examination     Toe Pain     Rahel Zamarripa DO  2/14/24       Subjective:   Tatyana Seymour comes to clinic for Diabetes (Dm foot examination ) and Toe Pain (Rahel Zamarripa, /2/14/24)    she is a diabetic and states that diabetic foot exam.   patient is seen for fungus toenails and a diabetic foot exam.  Pt currently has complaint of thickened, elongated nails that they cannot manage by themselves.   Pt's primary care physician is Rahel Zamarripa DO    patient has a new lesion on the left foot patient does relate that her feet sweat a lot     Lab Results   Component Value Date    LABA1C 9.0 (H) 12/15/2023      Complains of numbness in the feet bilat.  Past Medical History:   Diagnosis Date    Diabetes (HCC)     Hypertension     Pancreatitis 01/01/1991    gallstones    Post-menopausal bleeding        No Known Allergies  Current Outpatient Medications on File Prior to Visit   Medication Sig Dispense Refill    lisinopril-hydroCHLOROthiazide (PRINZIDE;ZESTORETIC) 20-25 MG per tablet Take 1 tablet by mouth daily 90 tablet 3    metFORMIN (GLUCOPHAGE) 1000 MG tablet Take 1 tablet by mouth 2 times daily (with meals) 180 tablet 3    blood glucose test strips (TRUE METRIX PRO BLOOD GLUCOSE) strip 1 each by In Vitro route daily As needed. 100 each 3    blood glucose monitor strips 1 strip by Other route daily Daily    Trumetrix test strips 100 strip 5    Blood Gluc Meter Disp-Strips (BLOOD

## 2024-04-02 ENCOUNTER — ANESTHESIA EVENT (OUTPATIENT)
Dept: OPERATING ROOM | Age: 59
End: 2024-04-02
Payer: COMMERCIAL

## 2024-04-03 ENCOUNTER — HOSPITAL ENCOUNTER (OUTPATIENT)
Age: 59
Setting detail: OUTPATIENT SURGERY
Discharge: HOME | End: 2024-04-03
Attending: OBSTETRICS & GYNECOLOGY | Admitting: OBSTETRICS & GYNECOLOGY
Payer: COMMERCIAL

## 2024-04-03 ENCOUNTER — ANESTHESIA (OUTPATIENT)
Dept: OPERATING ROOM | Age: 59
End: 2024-04-03
Payer: COMMERCIAL

## 2024-04-03 VITALS
SYSTOLIC BLOOD PRESSURE: 115 MMHG | HEART RATE: 69 BPM | WEIGHT: 231 LBS | TEMPERATURE: 98 F | OXYGEN SATURATION: 97 % | BODY MASS INDEX: 42.51 KG/M2 | DIASTOLIC BLOOD PRESSURE: 70 MMHG | RESPIRATION RATE: 16 BRPM | HEIGHT: 62 IN

## 2024-04-03 DIAGNOSIS — Z01.818 PREOP EXAMINATION: ICD-10-CM

## 2024-04-03 DIAGNOSIS — N95.0 POSTMENOPAUSAL BLEEDING: ICD-10-CM

## 2024-04-03 DIAGNOSIS — E11.9 TYPE 2 DIABETES MELLITUS WITHOUT COMPLICATION, WITHOUT LONG-TERM CURRENT USE OF INSULIN (HCC): Primary | ICD-10-CM

## 2024-04-03 DIAGNOSIS — R93.89 THICKENED ENDOMETRIUM: ICD-10-CM

## 2024-04-03 LAB
ABO + RH BLD: NORMAL
ALBUMIN SERPL-MCNC: 3.7 G/DL (ref 3.5–5.2)
ALP SERPL-CCNC: 69 U/L (ref 35–104)
ALT SERPL-CCNC: 17 U/L (ref 0–32)
ANION GAP SERPL CALCULATED.3IONS-SCNC: 9 MMOL/L (ref 7–16)
ARM BAND NUMBER: NORMAL
AST SERPL-CCNC: 21 U/L (ref 0–31)
BILIRUB SERPL-MCNC: 0.4 MG/DL (ref 0–1.2)
BLOOD BANK SAMPLE EXPIRATION: NORMAL
BLOOD GROUP ANTIBODIES SERPL: NEGATIVE
BUN SERPL-MCNC: 16 MG/DL (ref 6–23)
CALCIUM SERPL-MCNC: 9.1 MG/DL (ref 8.6–10.2)
CHLORIDE SERPL-SCNC: 103 MMOL/L (ref 98–107)
CO2 SERPL-SCNC: 25 MMOL/L (ref 22–29)
CREAT SERPL-MCNC: 0.5 MG/DL (ref 0.5–1)
EKG ATRIAL RATE: 55 BPM
EKG P AXIS: 20 DEGREES
EKG P-R INTERVAL: 182 MS
EKG Q-T INTERVAL: 448 MS
EKG QRS DURATION: 132 MS
EKG QTC CALCULATION (BAZETT): 428 MS
EKG R AXIS: -5 DEGREES
EKG T AXIS: -12 DEGREES
EKG VENTRICULAR RATE: 55 BPM
ERYTHROCYTE [DISTWIDTH] IN BLOOD BY AUTOMATED COUNT: 13.2 % (ref 11.5–15)
GFR SERPL CREATININE-BSD FRML MDRD: >90 ML/MIN/1.73M2
GLUCOSE BLD-MCNC: 166 MG/DL (ref 74–99)
GLUCOSE SERPL-MCNC: 161 MG/DL (ref 74–99)
HCT VFR BLD AUTO: 36.7 % (ref 34–48)
HGB BLD-MCNC: 11.6 G/DL (ref 11.5–15.5)
MCH RBC QN AUTO: 28.2 PG (ref 26–35)
MCHC RBC AUTO-ENTMCNC: 31.6 G/DL (ref 32–34.5)
MCV RBC AUTO: 89.1 FL (ref 80–99.9)
PLATELET # BLD AUTO: 299 K/UL (ref 130–450)
PMV BLD AUTO: 9.8 FL (ref 7–12)
POTASSIUM SERPL-SCNC: 4.1 MMOL/L (ref 3.5–5)
PROT SERPL-MCNC: 6.4 G/DL (ref 6.4–8.3)
RBC # BLD AUTO: 4.12 M/UL (ref 3.5–5.5)
SODIUM SERPL-SCNC: 137 MMOL/L (ref 132–146)
WBC OTHER # BLD: 7.3 K/UL (ref 4.5–11.5)

## 2024-04-03 PROCEDURE — 86900 BLOOD TYPING SEROLOGIC ABO: CPT

## 2024-04-03 PROCEDURE — 2500000003 HC RX 250 WO HCPCS

## 2024-04-03 PROCEDURE — 58558 HYSTEROSCOPY BIOPSY: CPT | Performed by: OBSTETRICS & GYNECOLOGY

## 2024-04-03 PROCEDURE — 6360000002 HC RX W HCPCS: Performed by: ANESTHESIOLOGY

## 2024-04-03 PROCEDURE — 3700000001 HC ADD 15 MINUTES (ANESTHESIA): Performed by: OBSTETRICS & GYNECOLOGY

## 2024-04-03 PROCEDURE — 86850 RBC ANTIBODY SCREEN: CPT

## 2024-04-03 PROCEDURE — 88305 TISSUE EXAM BY PATHOLOGIST: CPT

## 2024-04-03 PROCEDURE — 7100000010 HC PHASE II RECOVERY - FIRST 15 MIN: Performed by: OBSTETRICS & GYNECOLOGY

## 2024-04-03 PROCEDURE — 85027 COMPLETE CBC AUTOMATED: CPT

## 2024-04-03 PROCEDURE — 2580000003 HC RX 258

## 2024-04-03 PROCEDURE — 3600000014 HC SURGERY LEVEL 4 ADDTL 15MIN: Performed by: OBSTETRICS & GYNECOLOGY

## 2024-04-03 PROCEDURE — 82962 GLUCOSE BLOOD TEST: CPT

## 2024-04-03 PROCEDURE — 7100000000 HC PACU RECOVERY - FIRST 15 MIN: Performed by: OBSTETRICS & GYNECOLOGY

## 2024-04-03 PROCEDURE — 86901 BLOOD TYPING SEROLOGIC RH(D): CPT

## 2024-04-03 PROCEDURE — 2709999900 HC NON-CHARGEABLE SUPPLY: Performed by: OBSTETRICS & GYNECOLOGY

## 2024-04-03 PROCEDURE — 7100000011 HC PHASE II RECOVERY - ADDTL 15 MIN: Performed by: OBSTETRICS & GYNECOLOGY

## 2024-04-03 PROCEDURE — 3600000004 HC SURGERY LEVEL 4 BASE: Performed by: OBSTETRICS & GYNECOLOGY

## 2024-04-03 PROCEDURE — 80053 COMPREHEN METABOLIC PANEL: CPT

## 2024-04-03 PROCEDURE — 3700000000 HC ANESTHESIA ATTENDED CARE: Performed by: OBSTETRICS & GYNECOLOGY

## 2024-04-03 PROCEDURE — 7100000001 HC PACU RECOVERY - ADDTL 15 MIN: Performed by: OBSTETRICS & GYNECOLOGY

## 2024-04-03 PROCEDURE — 2720000010 HC SURG SUPPLY STERILE: Performed by: OBSTETRICS & GYNECOLOGY

## 2024-04-03 PROCEDURE — 6370000000 HC RX 637 (ALT 250 FOR IP): Performed by: ANESTHESIOLOGY

## 2024-04-03 PROCEDURE — 6360000002 HC RX W HCPCS

## 2024-04-03 RX ORDER — IBUPROFEN 800 MG/1
800 TABLET ORAL EVERY 8 HOURS PRN
Qty: 30 TABLET | Refills: 0 | Status: SHIPPED | OUTPATIENT
Start: 2024-04-03

## 2024-04-03 RX ORDER — SODIUM CHLORIDE, SODIUM LACTATE, POTASSIUM CHLORIDE, CALCIUM CHLORIDE 600; 310; 30; 20 MG/100ML; MG/100ML; MG/100ML; MG/100ML
INJECTION, SOLUTION INTRAVENOUS CONTINUOUS
Status: DISCONTINUED | OUTPATIENT
Start: 2024-04-03 | End: 2024-04-03 | Stop reason: HOSPADM

## 2024-04-03 RX ORDER — FENTANYL CITRATE 50 UG/ML
25 INJECTION, SOLUTION INTRAMUSCULAR; INTRAVENOUS EVERY 5 MIN PRN
Status: DISCONTINUED | OUTPATIENT
Start: 2024-04-03 | End: 2024-04-03 | Stop reason: HOSPADM

## 2024-04-03 RX ORDER — IBUPROFEN 800 MG/1
800 TABLET ORAL ONCE
Status: COMPLETED | OUTPATIENT
Start: 2024-04-03 | End: 2024-04-03

## 2024-04-03 RX ORDER — GLYCOPYRROLATE 0.2 MG/ML
INJECTION INTRAMUSCULAR; INTRAVENOUS PRN
Status: DISCONTINUED | OUTPATIENT
Start: 2024-04-03 | End: 2024-04-03 | Stop reason: SDUPTHER

## 2024-04-03 RX ORDER — SODIUM CHLORIDE 0.9 % (FLUSH) 0.9 %
5-40 SYRINGE (ML) INJECTION PRN
Status: DISCONTINUED | OUTPATIENT
Start: 2024-04-03 | End: 2024-04-03 | Stop reason: HOSPADM

## 2024-04-03 RX ORDER — SODIUM CHLORIDE 0.9 % (FLUSH) 0.9 %
5-40 SYRINGE (ML) INJECTION EVERY 12 HOURS SCHEDULED
Status: DISCONTINUED | OUTPATIENT
Start: 2024-04-03 | End: 2024-04-03 | Stop reason: HOSPADM

## 2024-04-03 RX ORDER — SODIUM CHLORIDE 9 MG/ML
INJECTION, SOLUTION INTRAVENOUS PRN
Status: DISCONTINUED | OUTPATIENT
Start: 2024-04-03 | End: 2024-04-03 | Stop reason: HOSPADM

## 2024-04-03 RX ORDER — FENTANYL CITRATE 50 UG/ML
INJECTION, SOLUTION INTRAMUSCULAR; INTRAVENOUS PRN
Status: DISCONTINUED | OUTPATIENT
Start: 2024-04-03 | End: 2024-04-03 | Stop reason: SDUPTHER

## 2024-04-03 RX ORDER — DEXAMETHASONE SODIUM PHOSPHATE 4 MG/ML
INJECTION, SOLUTION INTRA-ARTICULAR; INTRALESIONAL; INTRAMUSCULAR; INTRAVENOUS; SOFT TISSUE PRN
Status: DISCONTINUED | OUTPATIENT
Start: 2024-04-03 | End: 2024-04-03 | Stop reason: SDUPTHER

## 2024-04-03 RX ORDER — ROCURONIUM BROMIDE 10 MG/ML
INJECTION, SOLUTION INTRAVENOUS PRN
Status: DISCONTINUED | OUTPATIENT
Start: 2024-04-03 | End: 2024-04-03 | Stop reason: SDUPTHER

## 2024-04-03 RX ORDER — PROPOFOL 10 MG/ML
INJECTION, EMULSION INTRAVENOUS PRN
Status: DISCONTINUED | OUTPATIENT
Start: 2024-04-03 | End: 2024-04-03 | Stop reason: SDUPTHER

## 2024-04-03 RX ORDER — MORPHINE SULFATE 2 MG/ML
2 INJECTION, SOLUTION INTRAMUSCULAR; INTRAVENOUS EVERY 5 MIN PRN
Status: DISCONTINUED | OUTPATIENT
Start: 2024-04-03 | End: 2024-04-03 | Stop reason: HOSPADM

## 2024-04-03 RX ORDER — SUCCINYLCHOLINE/SOD CL,ISO/PF 200MG/10ML
SYRINGE (ML) INTRAVENOUS PRN
Status: DISCONTINUED | OUTPATIENT
Start: 2024-04-03 | End: 2024-04-03 | Stop reason: SDUPTHER

## 2024-04-03 RX ORDER — ONDANSETRON 2 MG/ML
INJECTION INTRAMUSCULAR; INTRAVENOUS PRN
Status: DISCONTINUED | OUTPATIENT
Start: 2024-04-03 | End: 2024-04-03 | Stop reason: SDUPTHER

## 2024-04-03 RX ORDER — ONDANSETRON 2 MG/ML
4 INJECTION INTRAMUSCULAR; INTRAVENOUS
Status: DISCONTINUED | OUTPATIENT
Start: 2024-04-03 | End: 2024-04-03 | Stop reason: HOSPADM

## 2024-04-03 RX ORDER — SODIUM CHLORIDE, SODIUM LACTATE, POTASSIUM CHLORIDE, CALCIUM CHLORIDE 600; 310; 30; 20 MG/100ML; MG/100ML; MG/100ML; MG/100ML
INJECTION, SOLUTION INTRAVENOUS CONTINUOUS PRN
Status: DISCONTINUED | OUTPATIENT
Start: 2024-04-03 | End: 2024-04-03 | Stop reason: SDUPTHER

## 2024-04-03 RX ORDER — NALOXONE HYDROCHLORIDE 0.4 MG/ML
INJECTION, SOLUTION INTRAMUSCULAR; INTRAVENOUS; SUBCUTANEOUS PRN
Status: DISCONTINUED | OUTPATIENT
Start: 2024-04-03 | End: 2024-04-03 | Stop reason: HOSPADM

## 2024-04-03 RX ORDER — MIDAZOLAM HYDROCHLORIDE 1 MG/ML
INJECTION INTRAMUSCULAR; INTRAVENOUS PRN
Status: DISCONTINUED | OUTPATIENT
Start: 2024-04-03 | End: 2024-04-03 | Stop reason: SDUPTHER

## 2024-04-03 RX ADMIN — FENTANYL CITRATE 100 MCG: 50 INJECTION, SOLUTION INTRAMUSCULAR; INTRAVENOUS at 12:03

## 2024-04-03 RX ADMIN — IBUPROFEN 800 MG: 800 TABLET ORAL at 14:19

## 2024-04-03 RX ADMIN — DEXAMETHASONE SODIUM PHOSPHATE 10 MG: 4 INJECTION, SOLUTION INTRAMUSCULAR; INTRAVENOUS at 12:06

## 2024-04-03 RX ADMIN — SODIUM CHLORIDE, POTASSIUM CHLORIDE, SODIUM LACTATE AND CALCIUM CHLORIDE: 600; 310; 30; 20 INJECTION, SOLUTION INTRAVENOUS at 11:53

## 2024-04-03 RX ADMIN — MIDAZOLAM 2 MG: 1 INJECTION INTRAMUSCULAR; INTRAVENOUS at 11:54

## 2024-04-03 RX ADMIN — ROCURONIUM BROMIDE 10 MG: 10 INJECTION, SOLUTION INTRAVENOUS at 12:03

## 2024-04-03 RX ADMIN — PROPOFOL 160 MG: 10 INJECTION, EMULSION INTRAVENOUS at 12:03

## 2024-04-03 RX ADMIN — MORPHINE SULFATE 2 MG: 2 INJECTION, SOLUTION INTRAMUSCULAR; INTRAVENOUS at 13:30

## 2024-04-03 RX ADMIN — ONDANSETRON 4 MG: 2 INJECTION INTRAMUSCULAR; INTRAVENOUS at 12:06

## 2024-04-03 RX ADMIN — GLYCOPYRROLATE 0.2 MG: 0.2 INJECTION INTRAMUSCULAR; INTRAVENOUS at 12:24

## 2024-04-03 RX ADMIN — Medication 160 MG: at 12:03

## 2024-04-03 ASSESSMENT — PAIN SCALES - GENERAL
PAINLEVEL_OUTOF10: 0
PAINLEVEL_OUTOF10: 8
PAINLEVEL_OUTOF10: 8
PAINLEVEL_OUTOF10: 0

## 2024-04-03 ASSESSMENT — PAIN DESCRIPTION - ORIENTATION: ORIENTATION: INNER

## 2024-04-03 ASSESSMENT — PAIN - FUNCTIONAL ASSESSMENT
PAIN_FUNCTIONAL_ASSESSMENT: ACTIVITIES ARE NOT PREVENTED
PAIN_FUNCTIONAL_ASSESSMENT: ACTIVITIES ARE NOT PREVENTED
PAIN_FUNCTIONAL_ASSESSMENT: 0-10

## 2024-04-03 ASSESSMENT — PAIN DESCRIPTION - LOCATION
LOCATION: ABDOMEN
LOCATION: VAGINA

## 2024-04-03 ASSESSMENT — PAIN DESCRIPTION - DESCRIPTORS
DESCRIPTORS: CRAMPING
DESCRIPTORS: CRAMPING

## 2024-04-03 NOTE — ANESTHESIA PRE PROCEDURE
Department of Anesthesiology  Preprocedure Note       Name:  Tatyana Seymour   Age:  58 y.o.  :  1965                                          MRN:  [id number]         Date:  4/3/2024      Surgeon: Surgeon(s):  Ayah Ivory DO    Procedure: Procedure(s):  DIAGNOSTIC HYSTEROSCOPY DILATATION AND CURETTAGE POSSIBLE  MYOSURE POLYPECTOMY    Medications prior to admission:   Prior to Admission medications    Medication Sig Start Date End Date Taking? Authorizing Provider   lisinopril-hydroCHLOROthiazide (PRINZIDE;ZESTORETIC) 20-25 MG per tablet Take 1 tablet by mouth daily 24   Rahel Zamarripa DO   metFORMIN (GLUCOPHAGE) 1000 MG tablet Take 1 tablet by mouth 2 times daily (with meals) 24   Rahel Zamarripa DO   blood glucose test strips (TRUE METRIX PRO BLOOD GLUCOSE) strip 1 each by In Vitro route daily As needed. 23   Rahel Zamarripa DO   blood glucose monitor strips 1 strip by Other route daily Daily    Trumetrix test strips 23   Rahel Zamarripa DO   Blood Gluc Meter Disp-Strips (BLOOD GLUCOSE METER DISPOSABLE) FARHAD 1 Units by Does not apply route daily 3/8/22   Rahel Zamarripa DO   blood glucose monitor supplies 1 each by Other route daily 3/8/22   Rahel Zamarripa DO   Lancets Thin MISC 1 Units by Does not apply route daily 3/8/22   Rahel Zamarripa DO   GlucoCom Lancets MISC 1 each by Does not apply route daily 16   Rahel Zamarripa DO       Current medications:    Current Facility-Administered Medications   Medication Dose Route Frequency Provider Last Rate Last Admin    lactated ringers IV soln infusion   IntraVENous Continuous Ayah Ivory DO        sodium chloride flush 0.9 % injection 5-40 mL  5-40 mL IntraVENous 2 times per day Ayah Ivory DO        sodium chloride flush 0.9 % injection 5-40 mL  5-40 mL IntraVENous PRN Ayah Ivory,         0.9 % sodium chloride infusion   IntraVENous PRN Ayah Ivory DO

## 2024-04-03 NOTE — H&P
Department of Gynecology   History and Physical        HISTORY OF PRESENT ILLNESS:                     The patient is a 58 y.o. female  who presents with postmenopausal bleeding. Endometrial biopsy was benign however endometrial stripe was 24 mm with cystic changes on US. Presents today for surgical management and diagnosis.      Past Medical History:        Diagnosis Date    Diabetes (HCC)     Hypertension     Pancreatitis 1991    gallstones    Post-menopausal bleeding      Past Surgical History:        Procedure Laterality Date    CHOLECYSTECTOMY  1991    gallstones, laparoscope    DILATION AND CURETTAGE OF UTERUS  1995       OB History    Para Term  AB Living   4 4 3     2   SAB IAB Ectopic Molar Multiple Live Births             4      # Outcome Date GA Lbr Charles/2nd Weight Sex Delivery Anes PTL Lv   4 Term 88    F Vag-Spont   DEC   3 Term 86    F Vag-Spont   DELBERT   2 Para 84    M Vag-Spont   DELBERT   1 Term      Vag-Spont   ND       Medications Prior to Admission:   Medications Prior to Admission: lisinopril-hydroCHLOROthiazide (PRINZIDE;ZESTORETIC) 20-25 MG per tablet, Take 1 tablet by mouth daily  metFORMIN (GLUCOPHAGE) 1000 MG tablet, Take 1 tablet by mouth 2 times daily (with meals)  blood glucose test strips (TRUE METRIX PRO BLOOD GLUCOSE) strip, 1 each by In Vitro route daily As needed.  blood glucose monitor strips, 1 strip by Other route daily Daily  Trumetrix test strips  Blood Gluc Meter Disp-Strips (BLOOD GLUCOSE METER DISPOSABLE) FARHAD, 1 Units by Does not apply route daily  blood glucose monitor supplies, 1 each by Other route daily  Lancets Thin MISC, 1 Units by Does not apply route daily  GlucoCom Lancets MISC, 1 each by Does not apply route daily    Allergies:  Patient has no known allergies.     Social History:  Social History     Socioeconomic History    Marital status: Single     Spouse name: None    Number of children: None    Years of education:  polypectomy      Ayah Ivory DO 4/3/2024 9:21 AM

## 2024-04-03 NOTE — OP NOTE
Operative Note      Patient: Tatyana Seymour  YOB: 1965  MRN: [id number]    Date of Procedure: 4/3/2024    Pre-Op Diagnosis Codes:     * Postmenopausal bleeding [N95.0]     * Thickened endometrium [R93.89]    Post-Op Diagnosis: Same       Procedure(s):  DIAGNOSTIC HYSTEROSCOPY DILATATION AND CURETTAGE POSSIBLE  MYOSURE POLYPECTOMY    Surgeon(s):  Ayah Ivory DO    Assistant:   * No surgical staff found *    Anesthesia: General    Estimated Blood Loss (mL): Minimal    Complications: None    Specimens:   * No specimens in log *    Implants:  * No implants in log *      Drains: * No LDAs found *    Findings:  Infection Present At Time Of Surgery (PATOS) (choose all levels that have infection present):  No infection present  Other Findings: The patient is a 59 yo  female with normal age specific female secondary sex characteristics and escutcheon. Vaginal mucosa and cervix were of normal morphology. Uterus was anteverted, anteflexed, and oriented along the anterior midline. Uterus sounded to 10 cm. Hysteroscopic examination revealed a thickened endometrium with three separate polyps. There were small calcifications throughout the cavity. [place] were visualized bilaterally. Curettings were scant as a majority of the biopsy was done with the myosure.    Detailed Description of Procedure:   The patient was taken to the operating room where a general anesthetic was administered. Upon adequate anesthesia, patient was placed in the dorsal lithotomy position and prepped and draped in a sterile manner. Bimanual exam was performed. Weighted speculum was placed. The anterior lip of the cervix was grasped with a single tooth tenaculum. The cervix was then serially dilated with Hussain metal dilators. Uterus sounded to 10 cm. A liquid media hysteroscope was inserted into the uterine cavity and a thorough hysteroscopic exam then ensued with the above noted findings.  The myosure was utilized to excise

## 2024-04-04 NOTE — ANESTHESIA POSTPROCEDURE EVALUATION
Department of Anesthesiology  Postprocedure Note    Patient: Tatyana Seymour  MRN: [id number]  YOB: 1965  Date of evaluation: 4/4/2024    Procedure Summary       Date: 04/03/24 Room / Location: [address]    Anesthesia Start: 1153 Anesthesia Stop: 1252    Procedure: DIAGNOSTIC HYSTEROSCOPY DILATATION AND CURETTAGE MYOSURE POLYPECTOMY (Vagina ) Diagnosis:       Postmenopausal bleeding      Thickened endometrium      (Postmenopausal bleeding [N95.0])      (Thickened endometrium [R93.89])    Surgeons: Ayah Ivory DO Responsible Provider: Leonel Gee DO    Anesthesia Type: general ASA Status: 3            Anesthesia Type: No value filed.    Poli Phase I: Poli Score: 9    Poli Phase II:      Anesthesia Post Evaluation    Patient location during evaluation: PACU  Patient participation: complete - patient participated  Level of consciousness: awake and alert  Pain score: 1  Airway patency: patent  Nausea & Vomiting: no nausea and no vomiting  Cardiovascular status: hemodynamically stable  Respiratory status: acceptable  Hydration status: euvolemic  Pain management: adequate    No notable events documented.

## 2024-04-08 LAB — SURGICAL PATHOLOGY REPORT: NORMAL

## 2024-04-25 ENCOUNTER — OFFICE VISIT (OUTPATIENT)
Dept: OBGYN | Age: 59
End: 2024-04-25
Payer: COMMERCIAL

## 2024-04-25 VITALS
HEART RATE: 57 BPM | BODY MASS INDEX: 42.09 KG/M2 | DIASTOLIC BLOOD PRESSURE: 78 MMHG | SYSTOLIC BLOOD PRESSURE: 119 MMHG | WEIGHT: 230.1 LBS

## 2024-04-25 DIAGNOSIS — Z98.890 POSTOPERATIVE STATE: Primary | ICD-10-CM

## 2024-04-25 PROCEDURE — 99213 OFFICE O/P EST LOW 20 MIN: CPT | Performed by: OBSTETRICS & GYNECOLOGY

## 2024-04-25 PROCEDURE — 99024 POSTOP FOLLOW-UP VISIT: CPT | Performed by: OBSTETRICS & GYNECOLOGY

## 2024-04-25 NOTE — PROGRESS NOTES
HISTORY OF PRESENT ILLNESS:    58 y.o. female  here for post op appointment. The patient has no complaints. Pain is controlled. Scant  vaginal bleeding. Tolerating regular diet. Denies gastrointestinal or urinary complaints.     Diagnosis: PMB, thickened endometrium, endometrial polyps   Surgery: diagnostic hysteroscopy, dilation and curettage, myosure polypectomy  Findings: 3 benign endometrial polyps  Complications: none      Past Medical History:   Past Medical History:   Diagnosis Date    Diabetes (HCC)     Hypertension     Pancreatitis 1991    gallstones    Post-menopausal bleeding                                              OB History    Para Term  AB Living   4 4 3     2   SAB IAB Ectopic Molar Multiple Live Births             4      # Outcome Date GA Lbr Charles/2nd Weight Sex Delivery Anes PTL Lv   4 Term 88    F Vag-Spont   DEC   3 Term 86    F Vag-Spont   DELBERT   2 Para 84    M Vag-Spont   DELBERT   1 Term      Vag-Spont   ND          Past Surgical History:   Past Surgical History:   Procedure Laterality Date    CHOLECYSTECTOMY  1991    gallstones, laparoscope    DILATION AND CURETTAGE OF UTERUS  1995    DILATION AND CURETTAGE OF UTERUS N/A 4/3/2024    DIAGNOSTIC HYSTEROSCOPY DILATATION AND CURETTAGE MYOSURE POLYPECTOMY performed by Ayah Ivory DO at [facility] OR        Allergies: Patient has no known allergies.     Medications:   Current Outpatient Medications   Medication Sig Dispense Refill    ibuprofen (ADVIL;MOTRIN) 800 MG tablet Take 1 tablet by mouth every 8 hours as needed for Pain 30 tablet 0    lisinopril-hydroCHLOROthiazide (PRINZIDE;ZESTORETIC) 20-25 MG per tablet Take 1 tablet by mouth daily 90 tablet 3    metFORMIN (GLUCOPHAGE) 1000 MG tablet Take 1 tablet by mouth 2 times daily (with meals) 180 tablet 3    blood glucose test strips (TRUE METRIX PRO BLOOD GLUCOSE) strip 1 each by In Vitro route daily As needed. 100 each 3    blood glucose monitor

## 2024-04-25 NOTE — PROGRESS NOTES
Patient here for Post-Op Hysteroscopy D&C myosure polypectomy. Patient reports no pain, and is vaginally bleeding.

## 2024-07-25 ENCOUNTER — OFFICE VISIT (OUTPATIENT)
Dept: FAMILY MEDICINE CLINIC | Age: 59
End: 2024-07-25
Payer: COMMERCIAL

## 2024-07-25 VITALS
HEART RATE: 77 BPM | TEMPERATURE: 97.1 F | BODY MASS INDEX: 42.14 KG/M2 | HEIGHT: 62 IN | RESPIRATION RATE: 16 BRPM | DIASTOLIC BLOOD PRESSURE: 78 MMHG | OXYGEN SATURATION: 97 % | SYSTOLIC BLOOD PRESSURE: 108 MMHG | WEIGHT: 229 LBS

## 2024-07-25 DIAGNOSIS — E78.2 MIXED HYPERLIPIDEMIA: ICD-10-CM

## 2024-07-25 DIAGNOSIS — Z12.31 ENCOUNTER FOR SCREENING MAMMOGRAM FOR MALIGNANT NEOPLASM OF BREAST: ICD-10-CM

## 2024-07-25 DIAGNOSIS — I10 PRIMARY HYPERTENSION: ICD-10-CM

## 2024-07-25 DIAGNOSIS — E11.8 TYPE 2 DIABETES MELLITUS WITH COMPLICATION, WITHOUT LONG-TERM CURRENT USE OF INSULIN (HCC): Primary | ICD-10-CM

## 2024-07-25 LAB — HBA1C MFR BLD: 7.9 %

## 2024-07-25 PROCEDURE — 3074F SYST BP LT 130 MM HG: CPT | Performed by: FAMILY MEDICINE

## 2024-07-25 PROCEDURE — 3051F HG A1C>EQUAL 7.0%<8.0%: CPT | Performed by: FAMILY MEDICINE

## 2024-07-25 PROCEDURE — 83036 HEMOGLOBIN GLYCOSYLATED A1C: CPT | Performed by: FAMILY MEDICINE

## 2024-07-25 PROCEDURE — 3078F DIAST BP <80 MM HG: CPT | Performed by: FAMILY MEDICINE

## 2024-07-25 PROCEDURE — 99214 OFFICE O/P EST MOD 30 MIN: CPT | Performed by: FAMILY MEDICINE

## 2024-07-25 SDOH — ECONOMIC STABILITY: FOOD INSECURITY: WITHIN THE PAST 12 MONTHS, YOU WORRIED THAT YOUR FOOD WOULD RUN OUT BEFORE YOU GOT MONEY TO BUY MORE.: NEVER TRUE

## 2024-07-25 SDOH — ECONOMIC STABILITY: INCOME INSECURITY: HOW HARD IS IT FOR YOU TO PAY FOR THE VERY BASICS LIKE FOOD, HOUSING, MEDICAL CARE, AND HEATING?: NOT HARD AT ALL

## 2024-07-25 SDOH — ECONOMIC STABILITY: FOOD INSECURITY: WITHIN THE PAST 12 MONTHS, THE FOOD YOU BOUGHT JUST DIDN'T LAST AND YOU DIDN'T HAVE MONEY TO GET MORE.: NEVER TRUE

## 2024-07-25 NOTE — PROGRESS NOTES
7/25/2024    Chief Complaint   Patient presents with    Hyperlipidemia     4 month follow up        Diabetes Mellitus Type II, Follow-up: Patient here for follow-up of Type 2 diabetes mellitus. This is a longstanding problem. Is taking all medications as prescribed and is tolerating well. Has been monitoring blood glucose at home.     Lab Results   Component Value Date    LABA1C 7.9 (A) 07/25/2024    LABA1C 9.0 (H) 12/15/2023    LABA1C 8.3 (H) 03/31/2023     Lab Results   Component Value Date    CREATININE 0.5 04/03/2024        Microalbumin: Microalbumen/Creatinine ratio:  No components found for: \"RUCREAT\"     Hypertension: Patient is here for follow up chronic hypertension.  Patient denies chest pain, diaphoresis, dyspnea, dyspnea on exertion, peripheral edema, palpitations, HA, visual issues. See List for current meds. Taking as prescribed. No adverse effects.    Hyperlipidemia:  Patient presents with hyperlipidemia. Is asymptomatic. This is a chronic problem. Recent labs reviewed. Compliance with treatment thus far has been adequate.  No adverse effects.       Patient's past medical, surgical, social and/or family history reviewed, updated in chart, and are non-contributory (unless otherwise stated).  Medications and allergies also reviewed and updated in chart.    ROS negative unless otherwise specified      Physical Exam  Wt Readings from Last 3 Encounters:   07/25/24 103.9 kg (229 lb)   04/25/24 104.4 kg (230 lb 1.6 oz)   03/28/24 104.8 kg (231 lb)     Temp Readings from Last 3 Encounters:   07/25/24 97.1 °F (36.2 °C)   04/03/24 98 °F (36.7 °C) (Temporal)   04/01/24 97.1 °F (36.2 °C) (Temporal)     BP Readings from Last 3 Encounters:   07/25/24 108/78   04/25/24 119/78   04/03/24 115/70     Pulse Readings from Last 3 Encounters:   07/25/24 77   04/25/24 57   04/03/24 69       General appearance: alert, well appearing, and in no distress, oriented to person, place, and time and normal appearing

## 2024-08-21 ENCOUNTER — PROCEDURE VISIT (OUTPATIENT)
Dept: PODIATRY | Age: 59
End: 2024-08-21

## 2024-08-21 VITALS
DIASTOLIC BLOOD PRESSURE: 78 MMHG | SYSTOLIC BLOOD PRESSURE: 126 MMHG | WEIGHT: 229 LBS | BODY MASS INDEX: 41.87 KG/M2 | TEMPERATURE: 97.8 F

## 2024-08-21 DIAGNOSIS — I73.9 PERIPHERAL VASCULAR DISEASE, UNSPECIFIED (HCC): ICD-10-CM

## 2024-08-21 DIAGNOSIS — M25.471 PAIN AND SWELLING OF RIGHT ANKLE: Primary | ICD-10-CM

## 2024-08-21 DIAGNOSIS — B35.1 TINEA UNGUIUM: ICD-10-CM

## 2024-08-21 DIAGNOSIS — M79.674 PAIN IN RIGHT TOE(S): ICD-10-CM

## 2024-08-21 DIAGNOSIS — R26.2 DIFFICULTY WALKING: ICD-10-CM

## 2024-08-21 DIAGNOSIS — M79.675 PAIN IN LEFT TOE(S): ICD-10-CM

## 2024-08-21 DIAGNOSIS — M25.571 PAIN AND SWELLING OF RIGHT ANKLE: Primary | ICD-10-CM

## 2024-08-21 RX ORDER — NAPROXEN 500 MG/1
500 TABLET ORAL 2 TIMES DAILY WITH MEALS
Qty: 60 TABLET | Refills: 5 | Status: SHIPPED | OUTPATIENT
Start: 2024-08-21

## 2024-08-21 NOTE — PROGRESS NOTES
foreign objects.  Shoes should have a deep, wide toe box area. With any type of shoe, the feet should be inspected for any signs of pressure, i.e., redness, blistering, or open lesions.  The patient was instructed to contact myself or other health care provider with any questions.   Electronically signed by Facundo Meyer DPM on 8/21/2024 at 7:35 AM  8/21/2024

## 2024-08-26 DIAGNOSIS — M25.471 PAIN AND SWELLING OF RIGHT ANKLE: Primary | ICD-10-CM

## 2024-08-26 DIAGNOSIS — M25.571 PAIN AND SWELLING OF RIGHT ANKLE: Primary | ICD-10-CM

## 2024-08-29 ENCOUNTER — HOSPITAL ENCOUNTER (OUTPATIENT)
Age: 59
Discharge: HOME | End: 2024-08-31
Payer: COMMERCIAL

## 2024-08-29 ENCOUNTER — HOSPITAL ENCOUNTER (OUTPATIENT)
Dept: GENERAL RADIOLOGY | Age: 59
Discharge: HOME | End: 2024-08-31
Payer: COMMERCIAL

## 2024-08-29 DIAGNOSIS — M25.571 PAIN AND SWELLING OF RIGHT ANKLE: ICD-10-CM

## 2024-08-29 DIAGNOSIS — M25.471 PAIN AND SWELLING OF RIGHT ANKLE: ICD-10-CM

## 2024-08-29 PROCEDURE — 73610 X-RAY EXAM OF ANKLE: CPT

## 2024-08-29 PROCEDURE — 73630 X-RAY EXAM OF FOOT: CPT

## 2025-01-13 DIAGNOSIS — I10 PRIMARY HYPERTENSION: ICD-10-CM

## 2025-01-14 RX ORDER — BLOOD SUGAR DIAGNOSTIC
STRIP MISCELLANEOUS
Qty: 100 EACH | Refills: 5 | Status: SHIPPED | OUTPATIENT
Start: 2025-01-14

## 2025-01-14 RX ORDER — LISINOPRIL AND HYDROCHLOROTHIAZIDE 20; 25 MG/1; MG/1
1 TABLET ORAL DAILY
Qty: 90 TABLET | Refills: 3 | Status: SHIPPED | OUTPATIENT
Start: 2025-01-14

## 2025-01-17 ENCOUNTER — HOSPITAL ENCOUNTER (OUTPATIENT)
Age: 60
Discharge: HOME | End: 2025-01-17
Payer: COMMERCIAL

## 2025-01-17 DIAGNOSIS — E78.2 MIXED HYPERLIPIDEMIA: ICD-10-CM

## 2025-01-17 DIAGNOSIS — I10 PRIMARY HYPERTENSION: ICD-10-CM

## 2025-01-17 DIAGNOSIS — E11.8 TYPE 2 DIABETES MELLITUS WITH COMPLICATION, WITHOUT LONG-TERM CURRENT USE OF INSULIN (HCC): ICD-10-CM

## 2025-01-17 LAB
ALBUMIN SERPL-MCNC: 4 G/DL (ref 3.5–5.2)
ALP SERPL-CCNC: 79 U/L (ref 35–104)
ALT SERPL-CCNC: 20 U/L (ref 0–32)
ANION GAP SERPL CALCULATED.3IONS-SCNC: 9 MMOL/L (ref 7–16)
AST SERPL-CCNC: 21 U/L (ref 0–31)
BILIRUB SERPL-MCNC: 0.4 MG/DL (ref 0–1.2)
BUN SERPL-MCNC: 18 MG/DL (ref 6–23)
CALCIUM SERPL-MCNC: 9.4 MG/DL (ref 8.6–10.2)
CHLORIDE SERPL-SCNC: 101 MMOL/L (ref 98–107)
CHOLEST SERPL-MCNC: 151 MG/DL
CO2 SERPL-SCNC: 27 MMOL/L (ref 22–29)
CREAT SERPL-MCNC: 0.7 MG/DL (ref 0.5–1)
ERYTHROCYTE [DISTWIDTH] IN BLOOD BY AUTOMATED COUNT: 13.4 % (ref 11.5–15)
GFR, ESTIMATED: >90 ML/MIN/1.73M2
GLUCOSE SERPL-MCNC: 142 MG/DL (ref 74–99)
HBA1C MFR BLD: 7.9 % (ref 4–5.6)
HCT VFR BLD AUTO: 40.3 % (ref 34–48)
HDLC SERPL-MCNC: 88 MG/DL
HGB BLD-MCNC: 12.6 G/DL (ref 11.5–15.5)
LDLC SERPL CALC-MCNC: 48 MG/DL
MCH RBC QN AUTO: 27.9 PG (ref 26–35)
MCHC RBC AUTO-ENTMCNC: 31.3 G/DL (ref 32–34.5)
MCV RBC AUTO: 89.2 FL (ref 80–99.9)
PLATELET # BLD AUTO: 342 K/UL (ref 130–450)
PMV BLD AUTO: 10 FL (ref 7–12)
POTASSIUM SERPL-SCNC: 4.4 MMOL/L (ref 3.5–5)
PROT SERPL-MCNC: 7.3 G/DL (ref 6.4–8.3)
RBC # BLD AUTO: 4.52 M/UL (ref 3.5–5.5)
SODIUM SERPL-SCNC: 137 MMOL/L (ref 132–146)
TRIGL SERPL-MCNC: 73 MG/DL
VLDLC SERPL CALC-MCNC: 15 MG/DL
WBC OTHER # BLD: 7.9 K/UL (ref 4.5–11.5)

## 2025-01-17 PROCEDURE — 83036 HEMOGLOBIN GLYCOSYLATED A1C: CPT

## 2025-01-17 PROCEDURE — 85027 COMPLETE CBC AUTOMATED: CPT

## 2025-01-17 PROCEDURE — 80061 LIPID PANEL: CPT

## 2025-01-17 PROCEDURE — 80053 COMPREHEN METABOLIC PANEL: CPT

## 2025-01-17 PROCEDURE — 36415 COLL VENOUS BLD VENIPUNCTURE: CPT

## 2025-03-13 ENCOUNTER — PROCEDURE VISIT (OUTPATIENT)
Dept: PODIATRY | Age: 60
End: 2025-03-13

## 2025-03-13 VITALS
HEART RATE: 64 BPM | DIASTOLIC BLOOD PRESSURE: 75 MMHG | SYSTOLIC BLOOD PRESSURE: 130 MMHG | BODY MASS INDEX: 41.87 KG/M2 | WEIGHT: 229 LBS | OXYGEN SATURATION: 98 % | TEMPERATURE: 97.6 F

## 2025-03-13 DIAGNOSIS — R26.2 DIFFICULTY WALKING: ICD-10-CM

## 2025-03-13 DIAGNOSIS — M25.471 PAIN AND SWELLING OF RIGHT ANKLE: Primary | ICD-10-CM

## 2025-03-13 DIAGNOSIS — M25.571 PAIN AND SWELLING OF RIGHT ANKLE: Primary | ICD-10-CM

## 2025-03-13 DIAGNOSIS — M79.675 PAIN IN LEFT TOE(S): ICD-10-CM

## 2025-03-13 DIAGNOSIS — I73.9 PERIPHERAL VASCULAR DISEASE, UNSPECIFIED: ICD-10-CM

## 2025-03-13 DIAGNOSIS — B35.1 TINEA UNGUIUM: ICD-10-CM

## 2025-03-13 DIAGNOSIS — M79.674 PAIN IN RIGHT TOE(S): ICD-10-CM

## 2025-03-13 DIAGNOSIS — E11.9 ENCOUNTER FOR DIABETIC FOOT EXAM (HCC): ICD-10-CM

## 2025-03-13 DIAGNOSIS — E11.9 TYPE 2 DIABETES MELLITUS WITHOUT COMPLICATION, WITHOUT LONG-TERM CURRENT USE OF INSULIN (HCC): ICD-10-CM

## 2025-03-13 NOTE — PROGRESS NOTES
[facility] [place] [facility]  [address] [address]  Dept: [phone]  Dept Fax: [phone]  Loc: [phone]    DIABETIC NAIL PROGRESS NOTE  Date of patient's visit: 3/13/2025  Patient's Name:  Tatyana Seymour YOB: 1965            Patient Care Team:  Rahel Zamarripa DO as PCP - General (Family Medicine)  Rahel Zamarripa DO as PCP - Empaneled Provider  Facundo Meyer DPM as Consulting Physician (Podiatry)  Facundo Meyer DPM as Consulting Physician (Foot and Ankle Surgery)          Chief Complaint   Patient presents with    Toe Pain     Rahel Zamarripa DO  LOV 1/13/25    Foot Pain     Right foot  Had xrays last visit  Discuss possible mri    Diabetes     Due for diabetic foot exam       Subjective:   Tatyana Seymour comes to clinic for Toe Pain (Rahel Zamarripa DO/LOV 1/13/25), Foot Pain (Right foot/Had xrays last visit/Discuss possible mri), and Diabetes (Due for diabetic foot exam)    she is a diabetic and states that diabetic foot exam.   patient is seen for fungus toenails and a diabetic foot exam.  Pt currently has complaint of thickened, elongated nails that they cannot manage by themselves.   Pt's primary care physician is Rahel Zamarripa DO    right ankle foot pain  hurts when working       Lab Results   Component Value Date    LABA1C 7.9 (H) 01/17/2025      Complains of numbness in the feet bilat.  Past Medical History:   Diagnosis Date    Diabetes (HCC)     Hypertension     Pancreatitis 01/01/1991    gallstones    Post-menopausal bleeding        No Known Allergies  Current Outpatient Medications on File Prior to Visit   Medication Sig Dispense Refill    lisinopril-hydroCHLOROthiazide (PRINZIDE;ZESTORETIC) 20-25 MG per tablet Take 1 tablet by mouth once daily 90 tablet 3    blood glucose test strips (RELION TRUE METRIX TEST STRIPS) strip USE TO TEST ONCE DAILY AS NEEDED 100 each 5

## 2025-04-03 ENCOUNTER — OFFICE VISIT (OUTPATIENT)
Dept: FAMILY MEDICINE CLINIC | Age: 60
End: 2025-04-03
Payer: COMMERCIAL

## 2025-04-03 VITALS
DIASTOLIC BLOOD PRESSURE: 70 MMHG | OXYGEN SATURATION: 97 % | SYSTOLIC BLOOD PRESSURE: 118 MMHG | TEMPERATURE: 97.1 F | BODY MASS INDEX: 41.62 KG/M2 | WEIGHT: 226.2 LBS | HEART RATE: 66 BPM | HEIGHT: 62 IN | RESPIRATION RATE: 18 BRPM

## 2025-04-03 DIAGNOSIS — E78.2 MIXED HYPERLIPIDEMIA: ICD-10-CM

## 2025-04-03 DIAGNOSIS — Z12.31 ENCOUNTER FOR SCREENING MAMMOGRAM FOR MALIGNANT NEOPLASM OF BREAST: ICD-10-CM

## 2025-04-03 DIAGNOSIS — I10 PRIMARY HYPERTENSION: ICD-10-CM

## 2025-04-03 DIAGNOSIS — E11.8 TYPE 2 DIABETES MELLITUS WITH COMPLICATION, WITHOUT LONG-TERM CURRENT USE OF INSULIN (HCC): Primary | ICD-10-CM

## 2025-04-03 LAB
CREATININE URINE: 86.9 MG/DL (ref 29–226)
MICROALBUMIN/CREAT 24H UR: <12 MG/L (ref 0–19)
MICROALBUMIN/CREAT UR-RTO: NORMAL MCG/MG CREAT (ref 0–30)

## 2025-04-03 PROCEDURE — 3074F SYST BP LT 130 MM HG: CPT | Performed by: FAMILY MEDICINE

## 2025-04-03 PROCEDURE — 3078F DIAST BP <80 MM HG: CPT | Performed by: FAMILY MEDICINE

## 2025-04-03 PROCEDURE — 3051F HG A1C>EQUAL 7.0%<8.0%: CPT | Performed by: FAMILY MEDICINE

## 2025-04-03 PROCEDURE — 99214 OFFICE O/P EST MOD 30 MIN: CPT | Performed by: FAMILY MEDICINE

## 2025-04-03 RX ORDER — LISINOPRIL AND HYDROCHLOROTHIAZIDE 20; 25 MG/1; MG/1
1 TABLET ORAL DAILY
Qty: 90 TABLET | Refills: 3 | Status: SHIPPED | OUTPATIENT
Start: 2025-04-03

## 2025-04-03 SDOH — ECONOMIC STABILITY: FOOD INSECURITY: WITHIN THE PAST 12 MONTHS, YOU WORRIED THAT YOUR FOOD WOULD RUN OUT BEFORE YOU GOT MONEY TO BUY MORE.: NEVER TRUE

## 2025-04-03 SDOH — ECONOMIC STABILITY: FOOD INSECURITY: WITHIN THE PAST 12 MONTHS, THE FOOD YOU BOUGHT JUST DIDN'T LAST AND YOU DIDN'T HAVE MONEY TO GET MORE.: NEVER TRUE

## 2025-04-03 ASSESSMENT — PATIENT HEALTH QUESTIONNAIRE - PHQ9
SUM OF ALL RESPONSES TO PHQ QUESTIONS 1-9: 0
1. LITTLE INTEREST OR PLEASURE IN DOING THINGS: NOT AT ALL
SUM OF ALL RESPONSES TO PHQ QUESTIONS 1-9: 0
2. FEELING DOWN, DEPRESSED OR HOPELESS: NOT AT ALL

## 2025-04-03 NOTE — PROGRESS NOTES
Encounters:   04/03/25 118/70   03/13/25 130/75   08/21/24 126/78     Pulse Readings from Last 3 Encounters:   04/03/25 66   03/13/25 64   07/25/24 77       General appearance: alert, well appearing, and in no distress, oriented to person, place, and time and normal appearing weight.    CVS exam: normal rate, regular rhythm, normal S1, S2, no murmurs, rubs, clicks or gallops.  Radial pulses 2+ bilateral.  PT/DP pulse 2+ bilat.  No C/C/E    Chest: clear to auscultation, no wheezes, rales or rhonchi, symmetric air entry.     Abdomen: Soft, non-tender, non-distended, positive BS in all 4 quadrants    Extremities:Dorsalis pedis pulses palpated bilaterally, no clubbing, cyanosis, edema or erythema,     SKIN: no lesions, jaundice, petechiae, pallor, cyanosis, ecchymosis    NEURO: gross motor exam normal by observation, gait normal    Mental status - alert, oriented to person, place, and time, normal mood, behavior, speech, dress, motor activity, and thought processes    Assessment & Plan  1. Health Maintenance.  - Weight has decreased by 4 pounds since last year.  - Blood pressure, pulse, and respirations are within normal limits.  - Prescription for bilateral digital screening mammogram provided; advised to get it done at her convenience, preferably in [place].  - Follow-up scheduled in 6 months.    2. Diabetes Mellitus.  - A1c level is currently at the target of 7.9.  - Blood work done in 01/2025; cholesterol levels are at goal (total cholesterol: 151, HDL: 88, LDL: 48).  - A repeat A1c test will be conducted in approximately one month; urine sample to be collected today to monitor for proteinuria.  - Continue current medication regimen, including metformin.    3. Blood pressure management.  - Blood pressure is well-regulated with the current combination therapy of lisinopril and hydrochlorothiazide.  - Continue current medication regimen.  - No changes in medication needed at this time.    4. Allergies.  - Reports

## 2025-04-04 ENCOUNTER — HOSPITAL ENCOUNTER (OUTPATIENT)
Dept: MRI IMAGING | Age: 60
Discharge: HOME | End: 2025-04-04
Attending: PODIATRIST
Payer: COMMERCIAL

## 2025-04-04 DIAGNOSIS — M25.471 PAIN AND SWELLING OF RIGHT ANKLE: ICD-10-CM

## 2025-04-04 DIAGNOSIS — M25.571 PAIN AND SWELLING OF RIGHT ANKLE: ICD-10-CM

## 2025-04-04 PROCEDURE — 73721 MRI JNT OF LWR EXTRE W/O DYE: CPT

## 2025-04-24 ENCOUNTER — OFFICE VISIT (OUTPATIENT)
Dept: PODIATRY | Age: 60
End: 2025-04-24

## 2025-04-24 VITALS
WEIGHT: 229 LBS | BODY MASS INDEX: 41.88 KG/M2 | SYSTOLIC BLOOD PRESSURE: 117 MMHG | TEMPERATURE: 97.3 F | DIASTOLIC BLOOD PRESSURE: 79 MMHG

## 2025-04-24 DIAGNOSIS — M25.571 PAIN AND SWELLING OF RIGHT ANKLE: Primary | ICD-10-CM

## 2025-04-24 DIAGNOSIS — M25.471 PAIN AND SWELLING OF RIGHT ANKLE: Primary | ICD-10-CM

## 2025-04-24 RX ORDER — TRIAMCINOLONE ACETONIDE 40 MG/ML
40 INJECTION, SUSPENSION INTRA-ARTICULAR; INTRAMUSCULAR ONCE
Status: COMPLETED | OUTPATIENT
Start: 2025-04-24 | End: 2025-04-24

## 2025-04-24 RX ORDER — BUPIVACAINE HYDROCHLORIDE 5 MG/ML
1 INJECTION, SOLUTION PERINEURAL ONCE
Status: COMPLETED | OUTPATIENT
Start: 2025-04-24 | End: 2025-04-24

## 2025-04-24 RX ADMIN — BUPIVACAINE HYDROCHLORIDE 1 MG: 5 INJECTION, SOLUTION PERINEURAL at 16:35

## 2025-04-24 RX ADMIN — TRIAMCINOLONE ACETONIDE 1 MG: 40 INJECTION, SUSPENSION INTRA-ARTICULAR; INTRAMUSCULAR at 16:35

## 2025-04-24 NOTE — PROGRESS NOTES
25  Tatyana Seymour : 1965 Sex: female  Age: 59 y.o.    Patient was referred by Rahel Wasserman DO    Chief Complaint   Patient presents with    Foot Pain     Today we are going over MRI results     Diabetes       SUBJECTIVE patient is seen today for ankle.  In review of the MRI still having pain.  Foot Pain     Diabetes      Review of Systems  Const: Denies constitutional symptoms  Musculo: Denies symptoms other than stated above  Skin: Denies symptoms other than stated above       Current Outpatient Medications:     lisinopril-hydroCHLOROthiazide (PRINZIDE;ZESTORETIC) 20-25 MG per tablet, Take 1 tablet by mouth daily, Disp: 90 tablet, Rfl: 3    metFORMIN (GLUCOPHAGE) 1000 MG tablet, Take 1 tablet by mouth 2 times daily (with meals), Disp: 180 tablet, Rfl: 3    blood glucose test strips (RELION TRUE METRIX TEST STRIPS) strip, USE TO TEST ONCE DAILY AS NEEDED, Disp: 100 each, Rfl: 5    GlucoCom Lancets MISC, 1 each by Does not apply route daily, Disp: 100 each, Rfl: 3    naproxen (NAPROSYN) 500 MG tablet, Take 1 tablet by mouth 2 times daily (with meals) (Patient not taking: Reported on 2025), Disp: 60 tablet, Rfl: 5  No Known Allergies    Past Medical History:   Diagnosis Date    Diabetes (HCC)     Hypertension     Pancreatitis 1991    gallstones    Post-menopausal bleeding      Past Surgical History:   Procedure Laterality Date    CHOLECYSTECTOMY  1991    gallstones, laparoscope    DILATION AND CURETTAGE OF UTERUS  1995    DILATION AND CURETTAGE OF UTERUS N/A 4/3/2024    DIAGNOSTIC HYSTEROSCOPY DILATATION AND CURETTAGE MYOSURE POLYPECTOMY performed by Ayah Ivory DO at [facility] OR     Family History   Problem Relation Age of Onset    Thyroid Disease Mother         Hyperthyroid    Heart Disease Father     High Blood Pressure Father     Diabetes Father     Thyroid Disease Sister         Hypothyroid    Diabetes Maternal Grandmother     Cancer Maternal Grandmother

## 2025-05-02 ENCOUNTER — HOSPITAL ENCOUNTER (OUTPATIENT)
Age: 60
Discharge: HOME | End: 2025-05-02
Payer: COMMERCIAL

## 2025-05-02 DIAGNOSIS — E11.8 TYPE 2 DIABETES MELLITUS WITH COMPLICATION, WITHOUT LONG-TERM CURRENT USE OF INSULIN (HCC): ICD-10-CM

## 2025-05-02 LAB — HBA1C MFR BLD: 7.9 % (ref 4–5.6)

## 2025-05-02 PROCEDURE — 36415 COLL VENOUS BLD VENIPUNCTURE: CPT

## 2025-05-02 PROCEDURE — 83036 HEMOGLOBIN GLYCOSYLATED A1C: CPT

## 2025-07-24 ENCOUNTER — OFFICE VISIT (OUTPATIENT)
Age: 60
End: 2025-07-24
Payer: COMMERCIAL

## 2025-07-24 VITALS
HEART RATE: 67 BPM | TEMPERATURE: 98.1 F | BODY MASS INDEX: 41.15 KG/M2 | OXYGEN SATURATION: 96 % | DIASTOLIC BLOOD PRESSURE: 82 MMHG | SYSTOLIC BLOOD PRESSURE: 137 MMHG | WEIGHT: 225 LBS

## 2025-07-24 DIAGNOSIS — Z12.31 SCREENING MAMMOGRAM, ENCOUNTER FOR: ICD-10-CM

## 2025-07-24 DIAGNOSIS — Z01.419 ENCOUNTER FOR GYNECOLOGICAL EXAMINATION: Primary | ICD-10-CM

## 2025-07-24 PROCEDURE — 99396 PREV VISIT EST AGE 40-64: CPT | Performed by: OBSTETRICS & GYNECOLOGY

## 2025-07-24 NOTE — PROGRESS NOTES
HISTORY OF PRESENT ILLNESS:    60 y.o. female   presents for her annual exam.     Patient's last menstrual period was 2023.  Periods are: n/a  Contraception: n/a  Number of new sexual partners: 0  Most recent pap smear:  normal cytology, endometrial cells present, no hpv, benign polyp on D&C  History of abnormal pap smears: as above  Most recent mammogram:  normal   History of abnormal mammogram: none  Most recent colonoscopy:  normal  Dexa scan: none  HPV vaccination:   Changes to health since last visit: none  Complaints: none      Past Medical History:   Past Medical History:   Diagnosis Date    Diabetes (HCC)     Hypertension     Pancreatitis 1991    gallstones    Post-menopausal bleeding                                              OB History    Para Term  AB Living   4 4 3   2   SAB IAB Ectopic Molar Multiple Live Births        4      # Outcome Date GA Lbr Charles/2nd Weight Sex Type Anes PTL Lv   4 Term 86    F Vag-Spont   DEC   3 Term 84    F Vag-Spont   DELBERT   2 Para 82    M Vag-Spont   DELBERT   1 Term      Vag-Spont   ND          Past Surgical History:   Past Surgical History:   Procedure Laterality Date    CHOLECYSTECTOMY  1991    gallstones, laparoscope    COLONOSCOPY      DILATION AND CURETTAGE OF UTERUS  1995    DILATION AND CURETTAGE OF UTERUS N/A 2024    DIAGNOSTIC HYSTEROSCOPY DILATATION AND CURETTAGE MYOSURE POLYPECTOMY performed by Ayah Ivory DO at [facility] OR        Allergies: Patient has no known allergies.     Medications:   Current Outpatient Medications   Medication Sig Dispense Refill    lisinopril-hydroCHLOROthiazide (PRINZIDE;ZESTORETIC) 20-25 MG per tablet Take 1 tablet by mouth daily 90 tablet 3    metFORMIN (GLUCOPHAGE) 1000 MG tablet Take 1 tablet by mouth 2 times daily (with meals) 180 tablet 3    blood glucose test strips (RELION TRUE METRIX TEST STRIPS) strip USE TO TEST ONCE DAILY AS NEEDED 100 each 5

## 2025-07-24 NOTE — PROGRESS NOTES
Here for her annual exam  Patient would like to discuss thyroid/family history  Discharge instructions have been discussed with the patient. Patient advised to call our office with any questions or concerns.   Voiced understanding.  Pap obtained, labeled and sent to lab

## 2025-07-30 LAB
HPV SAMPLE: NORMAL
HPV SOURCE: NORMAL
HPV, GENOTYPE 16: NOT DETECTED
HPV, GENOTYPE 18: NOT DETECTED
HPV, HIGH RISK OTHER: NOT DETECTED
HPV, INTERPRETATION: NORMAL

## 2025-08-06 LAB — GYNECOLOGY CYTOLOGY REPORT: NORMAL

## 2025-08-14 ENCOUNTER — OFFICE VISIT (OUTPATIENT)
Dept: PODIATRY | Age: 60
End: 2025-08-14
Payer: COMMERCIAL

## 2025-08-14 VITALS
DIASTOLIC BLOOD PRESSURE: 78 MMHG | BODY MASS INDEX: 40.79 KG/M2 | WEIGHT: 223 LBS | SYSTOLIC BLOOD PRESSURE: 121 MMHG | TEMPERATURE: 97.3 F

## 2025-08-14 DIAGNOSIS — B35.1 TINEA UNGUIUM: Primary | ICD-10-CM

## 2025-08-14 DIAGNOSIS — E11.9 TYPE 2 DIABETES MELLITUS WITHOUT COMPLICATION, WITHOUT LONG-TERM CURRENT USE OF INSULIN (HCC): ICD-10-CM

## 2025-08-14 DIAGNOSIS — M79.674 PAIN IN RIGHT TOE(S): ICD-10-CM

## 2025-08-14 PROCEDURE — 99212 OFFICE O/P EST SF 10 MIN: CPT | Performed by: PODIATRIST

## 2025-08-14 PROCEDURE — 3051F HG A1C>EQUAL 7.0%<8.0%: CPT | Performed by: PODIATRIST

## 2025-08-14 RX ORDER — CICLOPIROX 80 MG/ML
SOLUTION TOPICAL
Qty: 6 ML | Refills: 2 | Status: SHIPPED | OUTPATIENT
Start: 2025-08-14

## 2025-08-18 DIAGNOSIS — E11.8 TYPE 2 DIABETES MELLITUS WITH COMPLICATION, WITHOUT LONG-TERM CURRENT USE OF INSULIN (HCC): ICD-10-CM

## (undated) DEVICE — SOCK SPEC L9IN WHT UNIV W/ STD PRT FOR FLD MGMT

## (undated) DEVICE — JELLY,LUBE,STERILE,FLIP TOP,TUBE,2-OZ: Brand: MEDLINE

## (undated) DEVICE — MEDICINE CUP, GRADUATED, STER: Brand: MEDLINE

## (undated) DEVICE — 4-PORT MANIFOLD: Brand: NEPTUNE 2

## (undated) DEVICE — DRAPE,REIN 53X77,STERILE: Brand: MEDLINE

## (undated) DEVICE — SOLUTION IRRIG 3000ML 0.9% SOD CHL USP UROMATIC PLAS CONT

## (undated) DEVICE — PAD,NON-ADHERENT,3X8,STERILE,LF,1/PK: Brand: MEDLINE

## (undated) DEVICE — DEVICE TISS REM DIA3MM L25.25IN ENDOSCP F/ IU POLYPS

## (undated) DEVICE — SET FLD COLL CLR W/ BLT IN WARN SYS FOR HYSTSCP DOLPHIN

## (undated) DEVICE — GAUZE,SPONGE,4"X4",16PLY,XRAY,STRL,LF: Brand: MEDLINE

## (undated) DEVICE — MARKER,SKIN,WI/RULER AND LABELS: Brand: MEDLINE

## (undated) DEVICE — LEGGINGS, PAIR, 31X48, STERILE: Brand: MEDLINE

## (undated) DEVICE — PAD,SANITARY,11 IN,MAXI,N-STRL,IND WRAP: Brand: MEDLINE

## (undated) DEVICE — DOUBLE BASIN SET: Brand: MEDLINE INDUSTRIES, INC.

## (undated) DEVICE — CANISTER SUCT 3000ML FLD SAFE HYSTSCP

## (undated) DEVICE — FLUID MANAGEMENT SYSTEM, INTEGRATED TUBE SET, DO NOT USE IF PACKAGE IS DAMAGED, KEEP DRY, KEEP AWAY FROM SUNLIGHT, KEEP AWAY FROM HEAT AND RADIOACTIVE SOURCES: Brand: FLUID SAFE

## (undated) DEVICE — COVER,LIGHT HANDLE,FLX,2/PK: Brand: MEDLINE INDUSTRIES, INC.

## (undated) DEVICE — GLOVE SURG SZ 6 THK91MIL LTX FREE SYN POLYISOPRENE ANTI

## (undated) DEVICE — VAGINAL PREP TRAY: Brand: MEDLINE INDUSTRIES, INC.

## (undated) DEVICE — TOWEL,OR,DSP,ST,BLUE,STD,6/PK,12PK/CS: Brand: MEDLINE